# Patient Record
Sex: MALE | Race: WHITE | NOT HISPANIC OR LATINO | Employment: UNEMPLOYED | URBAN - METROPOLITAN AREA
[De-identification: names, ages, dates, MRNs, and addresses within clinical notes are randomized per-mention and may not be internally consistent; named-entity substitution may affect disease eponyms.]

---

## 2017-01-05 ENCOUNTER — ALLSCRIPTS OFFICE VISIT (OUTPATIENT)
Dept: OTHER | Facility: OTHER | Age: 32
End: 2017-01-05

## 2017-01-05 DIAGNOSIS — Z72.0 TOBACCO USE: ICD-10-CM

## 2017-02-02 ENCOUNTER — ALLSCRIPTS OFFICE VISIT (OUTPATIENT)
Dept: OTHER | Facility: OTHER | Age: 32
End: 2017-02-02

## 2017-02-22 ENCOUNTER — GENERIC CONVERSION - ENCOUNTER (OUTPATIENT)
Dept: OTHER | Facility: OTHER | Age: 32
End: 2017-02-22

## 2017-03-03 ENCOUNTER — ALLSCRIPTS OFFICE VISIT (OUTPATIENT)
Dept: OTHER | Facility: OTHER | Age: 32
End: 2017-03-03

## 2017-03-06 ENCOUNTER — TRANSCRIBE ORDERS (OUTPATIENT)
Dept: ADMINISTRATIVE | Facility: HOSPITAL | Age: 32
End: 2017-03-06

## 2017-03-06 DIAGNOSIS — R06.02 SOB (SHORTNESS OF BREATH): Primary | ICD-10-CM

## 2017-03-21 ENCOUNTER — HOSPITAL ENCOUNTER (OUTPATIENT)
Dept: PULMONOLOGY | Facility: HOSPITAL | Age: 32
Discharge: HOME | End: 2017-03-21
Attending: INTERNAL MEDICINE
Payer: MEDICARE

## 2017-03-21 ENCOUNTER — GENERIC CONVERSION - ENCOUNTER (OUTPATIENT)
Dept: OTHER | Facility: OTHER | Age: 32
End: 2017-03-21

## 2017-03-21 ENCOUNTER — HOSPITAL ENCOUNTER (OUTPATIENT)
Dept: RADIOLOGY | Facility: HOSPITAL | Age: 32
Discharge: HOME | End: 2017-03-21
Attending: INTERNAL MEDICINE
Payer: MEDICARE

## 2017-03-21 DIAGNOSIS — R06.02 SOB (SHORTNESS OF BREATH): ICD-10-CM

## 2017-03-21 DIAGNOSIS — Z72.0 TOBACCO USE: ICD-10-CM

## 2017-03-21 PROCEDURE — 71020 HB CHEST X-RAY 2VW FRONTAL&LATL: CPT

## 2017-03-21 PROCEDURE — 94760 N-INVAS EAR/PLS OXIMETRY 1: CPT

## 2017-03-21 PROCEDURE — 94727 GAS DIL/WSHOT DETER LNG VOL: CPT

## 2017-03-21 PROCEDURE — 94729 DIFFUSING CAPACITY: CPT

## 2017-03-21 PROCEDURE — 94060 EVALUATION OF WHEEZING: CPT

## 2017-03-31 ENCOUNTER — ALLSCRIPTS OFFICE VISIT (OUTPATIENT)
Dept: OTHER | Facility: OTHER | Age: 32
End: 2017-03-31

## 2017-07-06 ENCOUNTER — ALLSCRIPTS OFFICE VISIT (OUTPATIENT)
Dept: OTHER | Facility: OTHER | Age: 32
End: 2017-07-06

## 2017-11-09 ENCOUNTER — GENERIC CONVERSION - ENCOUNTER (OUTPATIENT)
Dept: OTHER | Facility: OTHER | Age: 32
End: 2017-11-09

## 2018-01-11 NOTE — MISCELLANEOUS
Message  Spoke with patient via telephone  He is complaining of migraine headaches since starting the modafanil for excessive daytime sleepiness, he is asking for an alternative medication  Will start patient on Nuvigil 150 1  mg once daily  Discussed there is still a chance he might get headaches while on this medication too  He will likely need prior authorization for this medication  He will need to f/u in 2 weeks to reevaluate  Tyler Palacios PA-C       1 Amended By: Kate Palma;  Feb 22 2017 1:00 PM EST    Signatures   Electronically signed by : Ab Simmons, [facility]; Feb 22 2017  1:00PM EST                       (Author)

## 2018-01-13 VITALS
BODY MASS INDEX: 21.05 KG/M2 | WEIGHT: 150.38 LBS | SYSTOLIC BLOOD PRESSURE: 110 MMHG | DIASTOLIC BLOOD PRESSURE: 80 MMHG | HEIGHT: 71 IN | HEART RATE: 67 BPM | OXYGEN SATURATION: 96 %

## 2018-01-13 VITALS
WEIGHT: 151.13 LBS | BODY MASS INDEX: 21.16 KG/M2 | OXYGEN SATURATION: 98 % | HEART RATE: 58 BPM | SYSTOLIC BLOOD PRESSURE: 116 MMHG | HEIGHT: 71 IN | DIASTOLIC BLOOD PRESSURE: 80 MMHG

## 2018-01-14 VITALS
BODY MASS INDEX: 20.77 KG/M2 | SYSTOLIC BLOOD PRESSURE: 120 MMHG | OXYGEN SATURATION: 97 % | HEART RATE: 74 BPM | DIASTOLIC BLOOD PRESSURE: 84 MMHG | HEIGHT: 71 IN | WEIGHT: 148.38 LBS

## 2018-01-14 VITALS
BODY MASS INDEX: 20.44 KG/M2 | DIASTOLIC BLOOD PRESSURE: 68 MMHG | SYSTOLIC BLOOD PRESSURE: 110 MMHG | HEART RATE: 66 BPM | WEIGHT: 146 LBS | HEIGHT: 71 IN | OXYGEN SATURATION: 98 %

## 2018-01-14 VITALS
OXYGEN SATURATION: 98 % | WEIGHT: 151.13 LBS | DIASTOLIC BLOOD PRESSURE: 70 MMHG | HEART RATE: 56 BPM | BODY MASS INDEX: 21.16 KG/M2 | HEIGHT: 71 IN | SYSTOLIC BLOOD PRESSURE: 110 MMHG

## 2018-01-22 VITALS
OXYGEN SATURATION: 97 % | SYSTOLIC BLOOD PRESSURE: 116 MMHG | WEIGHT: 142 LBS | BODY MASS INDEX: 19.88 KG/M2 | HEART RATE: 71 BPM | HEIGHT: 71 IN | DIASTOLIC BLOOD PRESSURE: 70 MMHG

## 2018-02-05 ENCOUNTER — TELEPHONE (OUTPATIENT)
Dept: PULMONOLOGY | Facility: CLINIC | Age: 33
End: 2018-02-05

## 2018-02-05 DIAGNOSIS — G47.11 IDIOPATHIC HYPERSOMNIA: Primary | ICD-10-CM

## 2018-02-05 DIAGNOSIS — G47.19 EXCESSIVE DAYTIME SLEEPINESS: Primary | ICD-10-CM

## 2018-02-05 NOTE — TELEPHONE ENCOUNTER
PT called for refill on Methylphenidate HCI ER 10MG oral tablet- Extended Release, needs written or printed script

## 2018-02-06 RX ORDER — METHYLPHENIDATE HYDROCHLORIDE 10 MG/1
20 TABLET ORAL
Qty: 60 TABLET | Refills: 0 | Status: SHIPPED | OUTPATIENT
Start: 2018-02-06 | End: 2018-04-11

## 2018-02-09 RX ORDER — METHYLPHENIDATE HYDROCHLORIDE 10 MG/1
10 CAPSULE, EXTENDED RELEASE ORAL DAILY
Qty: 30 CAPSULE | Refills: 0 | Status: SHIPPED | OUTPATIENT
Start: 2018-02-09 | End: 2018-04-11

## 2018-04-09 DIAGNOSIS — G47.19 EXCESSIVE DAYTIME SLEEPINESS: ICD-10-CM

## 2018-04-09 RX ORDER — METHYLPHENIDATE HYDROCHLORIDE 10 MG/1
20 TABLET ORAL
Qty: 60 TABLET | Refills: 0 | Status: CANCELLED | OUTPATIENT
Start: 2018-04-09
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
## 2018-04-10 ENCOUNTER — TELEPHONE (OUTPATIENT)
Dept: PULMONOLOGY | Facility: CLINIC | Age: 33
End: 2018-04-10

## 2018-04-10 NOTE — TELEPHONE ENCOUNTER
SPOKE TO PHARMACY [facility] ON [facility]  THE SCRIPTS THEY FILLED FROM US ON 3/6/18 WAS:  20 MG RITALIN TAKE 1 AT 5AM AND 1 AT 10AM  10 MG RITALIN ER TAKE 1 AT 3 PM AS NEEDED BOTH WERE A 30 DAY SUPPLY WITH NO REFILLS

## 2018-04-11 ENCOUNTER — TELEPHONE (OUTPATIENT)
Dept: PULMONOLOGY | Facility: CLINIC | Age: 33
End: 2018-04-11

## 2018-04-11 DIAGNOSIS — G47.11 IDIOPATHIC HYPERSOMNIA: ICD-10-CM

## 2018-04-11 DIAGNOSIS — G47.19 EXCESSIVE DAYTIME SLEEPINESS: ICD-10-CM

## 2018-04-11 RX ORDER — METHYLPHENIDATE HYDROCHLORIDE 20 MG/1
20 TABLET ORAL
COMMUNITY
End: 2018-04-12 | Stop reason: SDUPTHER

## 2018-04-11 RX ORDER — METHYLPHENIDATE HYDROCHLORIDE 10 MG/1
10 TABLET ORAL
COMMUNITY
End: 2018-04-12 | Stop reason: SDUPTHER

## 2018-04-11 NOTE — TELEPHONE ENCOUNTER
Pt called regarding his prescription methyophendate, he said he would like to pick it up    Please advise  Thank you

## 2018-04-11 NOTE — TELEPHONE ENCOUNTER
I will refill it for 1 more month  He will need a follow up appointment prior to additional refills  Dr Murali Madera would also like him to repeat the sleep study/MSLT with [facility]  Can have it at the [facility]  I will place the order  Please let the patient know

## 2018-04-12 DIAGNOSIS — G47.19 EXCESSIVE DAYTIME SLEEPINESS: Primary | ICD-10-CM

## 2018-04-12 RX ORDER — METHYLPHENIDATE HYDROCHLORIDE 10 MG/1
TABLET ORAL
Qty: 30 TABLET | Refills: 0 | Status: SHIPPED | OUTPATIENT
Start: 2018-04-12 | End: 2018-06-29 | Stop reason: HOSPADM

## 2018-04-12 RX ORDER — METHYLPHENIDATE HYDROCHLORIDE 20 MG/1
TABLET ORAL
Qty: 60 TABLET | Refills: 0 | Status: SHIPPED | OUTPATIENT
Start: 2018-04-12 | End: 2018-06-29 | Stop reason: HOSPADM

## 2018-04-26 ENCOUNTER — APPOINTMENT (INPATIENT)
Dept: RADIOLOGY | Facility: HOSPITAL | Age: 33
DRG: 501 | End: 2018-04-26
Payer: MEDICARE

## 2018-04-26 ENCOUNTER — ANESTHESIA EVENT (INPATIENT)
Dept: PERIOP | Facility: HOSPITAL | Age: 33
DRG: 501 | End: 2018-04-26
Payer: MEDICARE

## 2018-04-26 ENCOUNTER — APPOINTMENT (EMERGENCY)
Dept: CT IMAGING | Facility: HOSPITAL | Age: 33
DRG: 501 | End: 2018-04-26
Payer: MEDICARE

## 2018-04-26 ENCOUNTER — ANESTHESIA EVENT (OUTPATIENT)
Dept: EMERGENCY DEPT | Facility: HOSPITAL | Age: 33
End: 2018-04-26

## 2018-04-26 ENCOUNTER — HOSPITAL ENCOUNTER (INPATIENT)
Facility: HOSPITAL | Age: 33
LOS: 2 days | Discharge: HOME | DRG: 501 | End: 2018-04-28
Attending: EMERGENCY MEDICINE | Admitting: SURGERY
Payer: MEDICARE

## 2018-04-26 ENCOUNTER — APPOINTMENT (INPATIENT)
Dept: CT IMAGING | Facility: HOSPITAL | Age: 33
DRG: 501 | End: 2018-04-26
Payer: MEDICARE

## 2018-04-26 ENCOUNTER — ANESTHESIA (INPATIENT)
Dept: PERIOP | Facility: HOSPITAL | Age: 33
DRG: 501 | End: 2018-04-26
Payer: MEDICARE

## 2018-04-26 ENCOUNTER — ANESTHESIA (OUTPATIENT)
Dept: EMERGENCY DEPT | Facility: HOSPITAL | Age: 33
End: 2018-04-26

## 2018-04-26 DIAGNOSIS — F19.10 IV DRUG ABUSE (HCC): ICD-10-CM

## 2018-04-26 DIAGNOSIS — E46 MALNUTRITION (HCC): ICD-10-CM

## 2018-04-26 DIAGNOSIS — F19.10: ICD-10-CM

## 2018-04-26 DIAGNOSIS — L02.413 ABSCESS OF RIGHT ARM: Primary | ICD-10-CM

## 2018-04-26 DIAGNOSIS — L03.113 CELLULITIS OF RIGHT ARM: ICD-10-CM

## 2018-04-26 LAB
ABO GROUP BLD: NORMAL
ALBUMIN SERPL BCP-MCNC: 3.3 G/DL (ref 3.5–5)
ALP SERPL-CCNC: 105 U/L (ref 46–116)
ALT SERPL W P-5'-P-CCNC: 30 U/L (ref 12–78)
ANION GAP BLD CALC-SCNC: 15 MMOL/L (ref 4–13)
ANION GAP SERPL CALCULATED.3IONS-SCNC: 7 MMOL/L (ref 4–13)
APTT PPP: 32 SECONDS (ref 23–35)
AST SERPL W P-5'-P-CCNC: 22 U/L (ref 5–45)
BASOPHILS # BLD AUTO: 0.08 THOUSANDS/ΜL (ref 0–0.1)
BASOPHILS NFR BLD AUTO: 1 % (ref 0–1)
BILIRUB SERPL-MCNC: 0.5 MG/DL (ref 0.2–1)
BLD GP AB SCN SERPL QL: NEGATIVE
BUN BLD-MCNC: 11 MG/DL (ref 5–25)
BUN SERPL-MCNC: 12 MG/DL (ref 5–25)
CA-I BLD-SCNC: 1.13 MMOL/L (ref 1.12–1.32)
CALCIUM SERPL-MCNC: 8.6 MG/DL (ref 8.3–10.1)
CHLORIDE BLD-SCNC: 99 MMOL/L (ref 100–108)
CHLORIDE SERPL-SCNC: 100 MMOL/L (ref 100–108)
CO2 SERPL-SCNC: 29 MMOL/L (ref 21–32)
CREAT BLD-MCNC: 0.7 MG/DL (ref 0.6–1.3)
CREAT SERPL-MCNC: 0.78 MG/DL (ref 0.6–1.3)
CRP SERPL QL: 60.8 MG/L
EOSINOPHIL # BLD AUTO: 0.14 THOUSAND/ΜL (ref 0–0.61)
EOSINOPHIL NFR BLD AUTO: 1 % (ref 0–6)
ERYTHROCYTE [DISTWIDTH] IN BLOOD BY AUTOMATED COUNT: 12.4 % (ref 11.6–15.1)
ERYTHROCYTE [SEDIMENTATION RATE] IN BLOOD: 36 MM/HOUR (ref 0–10)
GFR SERPL CREATININE-BSD FRML MDRD: 119 ML/MIN/1.73SQ M
GFR SERPL CREATININE-BSD FRML MDRD: 124 ML/MIN/1.73SQ M
GLUCOSE SERPL-MCNC: 103 MG/DL (ref 65–140)
GLUCOSE SERPL-MCNC: 103 MG/DL (ref 65–140)
HCT VFR BLD AUTO: 35.8 % (ref 36.5–49.3)
HCT VFR BLD CALC: 35 % (ref 36.5–49.3)
HGB BLD-MCNC: 11.5 G/DL (ref 12–17)
HGB BLDA-MCNC: 11.9 G/DL (ref 12–17)
INR PPP: 0.96 (ref 0.86–1.16)
LYMPHOCYTES # BLD AUTO: 1.67 THOUSANDS/ΜL (ref 0.6–4.47)
LYMPHOCYTES NFR BLD AUTO: 13 % (ref 14–44)
MCH RBC QN AUTO: 29.3 PG (ref 26.8–34.3)
MCHC RBC AUTO-ENTMCNC: 32.1 G/DL (ref 31.4–37.4)
MCV RBC AUTO: 91 FL (ref 82–98)
MONOCYTES # BLD AUTO: 1.05 THOUSAND/ΜL (ref 0.17–1.22)
MONOCYTES NFR BLD AUTO: 8 % (ref 4–12)
NEUTROPHILS # BLD AUTO: 10.26 THOUSANDS/ΜL (ref 1.85–7.62)
NEUTS SEG NFR BLD AUTO: 77 % (ref 43–75)
NRBC BLD AUTO-RTO: 0 /100 WBCS
PCO2 BLD: 29 MMOL/L (ref 21–32)
PLATELET # BLD AUTO: 265 THOUSANDS/UL (ref 149–390)
PMV BLD AUTO: 9.1 FL (ref 8.9–12.7)
POTASSIUM BLD-SCNC: 3.8 MMOL/L (ref 3.5–5.3)
POTASSIUM SERPL-SCNC: 3.8 MMOL/L (ref 3.5–5.3)
PROT SERPL-MCNC: 7.5 G/DL (ref 6.4–8.2)
PROTHROMBIN TIME: 13 SECONDS (ref 12.1–14.4)
RBC # BLD AUTO: 3.93 MILLION/UL (ref 3.88–5.62)
RH BLD: POSITIVE
SODIUM BLD-SCNC: 138 MMOL/L (ref 136–145)
SODIUM SERPL-SCNC: 136 MMOL/L (ref 136–145)
SPECIMEN EXPIRATION DATE: NORMAL
SPECIMEN SOURCE: ABNORMAL
WBC # BLD AUTO: 13.26 THOUSAND/UL (ref 4.31–10.16)

## 2018-04-26 PROCEDURE — 85652 RBC SED RATE AUTOMATED: CPT | Performed by: EMERGENCY MEDICINE

## 2018-04-26 PROCEDURE — 86140 C-REACTIVE PROTEIN: CPT | Performed by: EMERGENCY MEDICINE

## 2018-04-26 PROCEDURE — 86901 BLOOD TYPING SEROLOGIC RH(D): CPT | Performed by: EMERGENCY MEDICINE

## 2018-04-26 PROCEDURE — 71250 CT THORAX DX C-: CPT

## 2018-04-26 PROCEDURE — 86900 BLOOD TYPING SEROLOGIC ABO: CPT | Performed by: EMERGENCY MEDICINE

## 2018-04-26 PROCEDURE — 87075 CULTR BACTERIA EXCEPT BLOOD: CPT | Performed by: SURGERY

## 2018-04-26 PROCEDURE — 93005 ELECTROCARDIOGRAM TRACING: CPT | Performed by: PHYSICIAN ASSISTANT

## 2018-04-26 PROCEDURE — 80047 BASIC METABLC PNL IONIZED CA: CPT

## 2018-04-26 PROCEDURE — 87070 CULTURE OTHR SPECIMN AEROBIC: CPT | Performed by: SURGERY

## 2018-04-26 PROCEDURE — 99222 1ST HOSP IP/OBS MODERATE 55: CPT | Performed by: SURGERY

## 2018-04-26 PROCEDURE — 85025 COMPLETE CBC W/AUTO DIFF WBC: CPT | Performed by: EMERGENCY MEDICINE

## 2018-04-26 PROCEDURE — 80053 COMPREHEN METABOLIC PANEL: CPT | Performed by: EMERGENCY MEDICINE

## 2018-04-26 PROCEDURE — 85610 PROTHROMBIN TIME: CPT | Performed by: EMERGENCY MEDICINE

## 2018-04-26 PROCEDURE — 87176 TISSUE HOMOGENIZATION CULTR: CPT | Performed by: SURGERY

## 2018-04-26 PROCEDURE — 85014 HEMATOCRIT: CPT

## 2018-04-26 PROCEDURE — 87147 CULTURE TYPE IMMUNOLOGIC: CPT | Performed by: SURGERY

## 2018-04-26 PROCEDURE — 87185 SC STD ENZYME DETCJ PER NZM: CPT | Performed by: SURGERY

## 2018-04-26 PROCEDURE — 87040 BLOOD CULTURE FOR BACTERIA: CPT | Performed by: EMERGENCY MEDICINE

## 2018-04-26 PROCEDURE — 96365 THER/PROPH/DIAG IV INF INIT: CPT

## 2018-04-26 PROCEDURE — 87205 SMEAR GRAM STAIN: CPT | Performed by: SURGERY

## 2018-04-26 PROCEDURE — 10061 I&D ABSCESS COMP/MULTIPLE: CPT | Performed by: SURGERY

## 2018-04-26 PROCEDURE — 36415 COLL VENOUS BLD VENIPUNCTURE: CPT | Performed by: EMERGENCY MEDICINE

## 2018-04-26 PROCEDURE — 99285 EMERGENCY DEPT VISIT HI MDM: CPT

## 2018-04-26 PROCEDURE — 85730 THROMBOPLASTIN TIME PARTIAL: CPT | Performed by: EMERGENCY MEDICINE

## 2018-04-26 PROCEDURE — 73201 CT UPPER EXTREMITY W/DYE: CPT

## 2018-04-26 PROCEDURE — 0K990ZZ DRAINAGE OF RIGHT LOWER ARM AND WRIST MUSCLE, OPEN APPROACH: ICD-10-PCS | Performed by: SURGERY

## 2018-04-26 PROCEDURE — 96366 THER/PROPH/DIAG IV INF ADDON: CPT

## 2018-04-26 PROCEDURE — 86850 RBC ANTIBODY SCREEN: CPT | Performed by: EMERGENCY MEDICINE

## 2018-04-26 PROCEDURE — 71045 X-RAY EXAM CHEST 1 VIEW: CPT

## 2018-04-26 PROCEDURE — 87077 CULTURE AEROBIC IDENTIFY: CPT | Performed by: SURGERY

## 2018-04-26 PROCEDURE — 99233 SBSQ HOSP IP/OBS HIGH 50: CPT | Performed by: INTERNAL MEDICINE

## 2018-04-26 RX ORDER — ONDANSETRON 2 MG/ML
4 INJECTION INTRAMUSCULAR; INTRAVENOUS EVERY 6 HOURS PRN
Status: DISCONTINUED | OUTPATIENT
Start: 2018-04-26 | End: 2018-04-28 | Stop reason: HOSPADM

## 2018-04-26 RX ORDER — ONDANSETRON 2 MG/ML
4 INJECTION INTRAMUSCULAR; INTRAVENOUS ONCE AS NEEDED
Status: DISCONTINUED | OUTPATIENT
Start: 2018-04-26 | End: 2018-04-26 | Stop reason: HOSPADM

## 2018-04-26 RX ORDER — METHADONE HYDROCHLORIDE 10 MG/1
10 TABLET ORAL ONCE
Status: DISCONTINUED | OUTPATIENT
Start: 2018-04-26 | End: 2018-04-26

## 2018-04-26 RX ORDER — PROPOFOL 10 MG/ML
INJECTION, EMULSION INTRAVENOUS AS NEEDED
Status: DISCONTINUED | OUTPATIENT
Start: 2018-04-26 | End: 2018-04-26 | Stop reason: SURG

## 2018-04-26 RX ORDER — METHADONE HYDROCHLORIDE 10 MG/ML
137 CONCENTRATE ORAL DAILY
Status: DISCONTINUED | OUTPATIENT
Start: 2018-04-27 | End: 2018-04-26

## 2018-04-26 RX ORDER — MAGNESIUM HYDROXIDE 1200 MG/15ML
LIQUID ORAL AS NEEDED
Status: DISCONTINUED | OUTPATIENT
Start: 2018-04-26 | End: 2018-04-26 | Stop reason: HOSPADM

## 2018-04-26 RX ORDER — METHADONE HYDROCHLORIDE 40 MG/1
137 TABLET ORAL
COMMUNITY
End: 2018-07-11 | Stop reason: HOSPADM

## 2018-04-26 RX ORDER — PROPOFOL 10 MG/ML
INJECTION, EMULSION INTRAVENOUS CONTINUOUS PRN
Status: DISCONTINUED | OUTPATIENT
Start: 2018-04-26 | End: 2018-04-26 | Stop reason: SURG

## 2018-04-26 RX ORDER — SODIUM CHLORIDE, SODIUM LACTATE, POTASSIUM CHLORIDE, CALCIUM CHLORIDE 600; 310; 30; 20 MG/100ML; MG/100ML; MG/100ML; MG/100ML
INJECTION, SOLUTION INTRAVENOUS CONTINUOUS PRN
Status: DISCONTINUED | OUTPATIENT
Start: 2018-04-26 | End: 2018-04-26 | Stop reason: SURG

## 2018-04-26 RX ORDER — KETOROLAC TROMETHAMINE 30 MG/ML
15 INJECTION, SOLUTION INTRAMUSCULAR; INTRAVENOUS EVERY 6 HOURS SCHEDULED
Status: DISCONTINUED | OUTPATIENT
Start: 2018-04-26 | End: 2018-04-28 | Stop reason: HOSPADM

## 2018-04-26 RX ORDER — VANCOMYCIN HYDROCHLORIDE 1 G/200ML
15 INJECTION, SOLUTION INTRAVENOUS ONCE
Status: COMPLETED | OUTPATIENT
Start: 2018-04-26 | End: 2018-04-26

## 2018-04-26 RX ORDER — METHADONE HYDROCHLORIDE 10 MG/ML
5 INJECTION, SOLUTION INTRAMUSCULAR; INTRAVENOUS; SUBCUTANEOUS ONCE
Status: DISCONTINUED | OUTPATIENT
Start: 2018-04-26 | End: 2018-04-26

## 2018-04-26 RX ORDER — ACETAMINOPHEN 325 MG/1
650 TABLET ORAL EVERY 6 HOURS PRN
Status: DISCONTINUED | OUTPATIENT
Start: 2018-04-26 | End: 2018-04-28 | Stop reason: HOSPADM

## 2018-04-26 RX ORDER — DEXTROSE, SODIUM CHLORIDE, AND POTASSIUM CHLORIDE 5; .45; .15 G/100ML; G/100ML; G/100ML
125 INJECTION INTRAVENOUS CONTINUOUS
Status: DISCONTINUED | OUTPATIENT
Start: 2018-04-26 | End: 2018-04-28 | Stop reason: HOSPADM

## 2018-04-26 RX ORDER — KETAMINE HYDROCHLORIDE 50 MG/ML
INJECTION, SOLUTION, CONCENTRATE INTRAMUSCULAR; INTRAVENOUS AS NEEDED
Status: DISCONTINUED | OUTPATIENT
Start: 2018-04-26 | End: 2018-04-26 | Stop reason: SURG

## 2018-04-26 RX ORDER — SODIUM CHLORIDE, SODIUM LACTATE, POTASSIUM CHLORIDE, CALCIUM CHLORIDE 600; 310; 30; 20 MG/100ML; MG/100ML; MG/100ML; MG/100ML
75 INJECTION, SOLUTION INTRAVENOUS CONTINUOUS
Status: DISCONTINUED | OUTPATIENT
Start: 2018-04-26 | End: 2018-04-28 | Stop reason: HOSPADM

## 2018-04-26 RX ORDER — METOCLOPRAMIDE HYDROCHLORIDE 5 MG/ML
10 INJECTION INTRAMUSCULAR; INTRAVENOUS ONCE AS NEEDED
Status: DISCONTINUED | OUTPATIENT
Start: 2018-04-26 | End: 2018-04-26 | Stop reason: HOSPADM

## 2018-04-26 RX ORDER — DOCUSATE SODIUM 100 MG/1
100 CAPSULE, LIQUID FILLED ORAL 2 TIMES DAILY
Status: DISCONTINUED | OUTPATIENT
Start: 2018-04-26 | End: 2018-04-28 | Stop reason: HOSPADM

## 2018-04-26 RX ORDER — KETOROLAC TROMETHAMINE 30 MG/ML
30 INJECTION, SOLUTION INTRAMUSCULAR; INTRAVENOUS ONCE
Status: COMPLETED | OUTPATIENT
Start: 2018-04-26 | End: 2018-04-26

## 2018-04-26 RX ORDER — NICOTINE 21 MG/24HR
1 PATCH, TRANSDERMAL 24 HOURS TRANSDERMAL DAILY
Status: DISCONTINUED | OUTPATIENT
Start: 2018-04-26 | End: 2018-04-28 | Stop reason: HOSPADM

## 2018-04-26 RX ORDER — METHADONE HYDROCHLORIDE 10 MG/ML
137 CONCENTRATE ORAL DAILY
Status: DISCONTINUED | OUTPATIENT
Start: 2018-04-26 | End: 2018-04-27

## 2018-04-26 RX ORDER — VANCOMYCIN HYDROCHLORIDE 1 G/200ML
15 INJECTION, SOLUTION INTRAVENOUS EVERY 12 HOURS
Status: DISCONTINUED | OUTPATIENT
Start: 2018-04-27 | End: 2018-04-28 | Stop reason: HOSPADM

## 2018-04-26 RX ORDER — BUPIVACAINE HYDROCHLORIDE 5 MG/ML
INJECTION, SOLUTION PERINEURAL AS NEEDED
Status: DISCONTINUED | OUTPATIENT
Start: 2018-04-26 | End: 2018-04-26 | Stop reason: SURG

## 2018-04-26 RX ORDER — MIDAZOLAM HYDROCHLORIDE 1 MG/ML
INJECTION INTRAMUSCULAR; INTRAVENOUS AS NEEDED
Status: DISCONTINUED | OUTPATIENT
Start: 2018-04-26 | End: 2018-04-26 | Stop reason: SURG

## 2018-04-26 RX ADMIN — PROPOFOL 100 MCG/KG/MIN: 10 INJECTION, EMULSION INTRAVENOUS at 20:30

## 2018-04-26 RX ADMIN — DEXTROSE, SODIUM CHLORIDE, AND POTASSIUM CHLORIDE 125 ML/HR: 5; .45; .15 INJECTION INTRAVENOUS at 21:57

## 2018-04-26 RX ADMIN — KETOROLAC TROMETHAMINE 15 MG: 30 INJECTION, SOLUTION INTRAMUSCULAR at 23:35

## 2018-04-26 RX ADMIN — VANCOMYCIN HYDROCHLORIDE 1000 MG: 1 INJECTION, SOLUTION INTRAVENOUS at 08:27

## 2018-04-26 RX ADMIN — IOHEXOL 100 ML: 350 INJECTION, SOLUTION INTRAVENOUS at 08:19

## 2018-04-26 RX ADMIN — KETAMINE HYDROCHLORIDE 25 MG: 50 INJECTION INTRAMUSCULAR; INTRAVENOUS at 18:33

## 2018-04-26 RX ADMIN — NICOTINE 1 PATCH: 21 PATCH TRANSDERMAL at 13:01

## 2018-04-26 RX ADMIN — BUPIVACAINE HYDROCHLORIDE 30 ML: 5 INJECTION, SOLUTION PERINEURAL at 18:33

## 2018-04-26 RX ADMIN — SODIUM CHLORIDE 1000 ML: 0.9 INJECTION, SOLUTION INTRAVENOUS at 07:58

## 2018-04-26 RX ADMIN — MIDAZOLAM 2 MG: 1 INJECTION INTRAMUSCULAR; INTRAVENOUS at 18:33

## 2018-04-26 RX ADMIN — KETOROLAC TROMETHAMINE 30 MG: 30 INJECTION, SOLUTION INTRAMUSCULAR at 13:02

## 2018-04-26 RX ADMIN — KETAMINE HYDROCHLORIDE 25 MG: 50 INJECTION INTRAMUSCULAR; INTRAVENOUS at 18:31

## 2018-04-26 RX ADMIN — PROPOFOL 100 MG: 10 INJECTION, EMULSION INTRAVENOUS at 20:30

## 2018-04-26 RX ADMIN — VANCOMYCIN HYDROCHLORIDE 1 G: 1 INJECTION, POWDER, LYOPHILIZED, FOR SOLUTION INTRAVENOUS at 20:24

## 2018-04-26 RX ADMIN — SODIUM CHLORIDE, SODIUM LACTATE, POTASSIUM CHLORIDE, AND CALCIUM CHLORIDE: .6; .31; .03; .02 INJECTION, SOLUTION INTRAVENOUS at 18:25

## 2018-04-26 RX ADMIN — METHADONE HYDROCHLORIDE 137 MG: 10 CONCENTRATE ORAL at 17:20

## 2018-04-26 NOTE — ED PROVIDER NOTES
History  Chief Complaint   Patient presents with    Abscess     pt left [facility] ED  was supposed to have surgery to drain abscess on right arm  would like 2nd opinion     39-year-old male with a history of IV drug abuse on methadone presenting with chief complaint of right elbow abscess  The patient states approximately 6 days ago he started using heroin again he had some small amount of erythema redness and warmth localized to his right antecubital area since that time it has gotten progressively worse he went to another hospital yesterday was admitted for IV antibiotics planned to go to the operating this room this morning, he left against medical advice apparently because he was dissatisfied with the care and is here for 2nd opinion  Patient localizes pain primarily to his right proximal forearm and elbow is nonradiating it is worse when he tries to move it is better with rest he is not taking for this he denies weakness paresthesias or anesthesia he denies fevers or chills other infectious complaints he otherwise denies complete review systems as noted            Prior to Admission Medications   Prescriptions Last Dose Informant Patient Reported? Taking? methadone (METHADOSE) 40 MG disintegrating tablet 4/25/2018 at Unknown time  Yes Yes   Sig: Take 137 mg by mouth daily in the early morning     methylphenidate (RITALIN) 10 mg tablet 4/23/2018  No No   Sig: One tab at 3PM if needed   methylphenidate (RITALIN) 20 MG tablet 4/23/2018  No No   Sig: One tab 6am and one at10am      Facility-Administered Medications: None       Past Medical History:   Diagnosis Date    Excessive daytime sleepiness     Idiopathic hypersomnia     Tobacco use        Past Surgical History:   Procedure Laterality Date    SHOULDER SURGERY Right     titanium plate       History reviewed  No pertinent family history  I have reviewed and agree with the history as documented      Social History   Substance Use Topics  Smoking status: Current Every Day Smoker     Packs/day: 2 00     Types: Cigarettes    Smokeless tobacco: Never Used    Alcohol use No        Review of Systems   Constitutional: Negative for chills and fever  HENT: Negative for rhinorrhea and sore throat  Respiratory: Negative for cough and shortness of breath  Cardiovascular: Negative for chest pain and palpitations  Gastrointestinal: Negative for abdominal pain, diarrhea, nausea and vomiting  Genitourinary: Negative for dysuria, frequency and urgency  Musculoskeletal: Positive for joint swelling  Negative for arthralgias, back pain and myalgias  Skin: Positive for color change and wound  Negative for rash  Neurological: Negative for dizziness, weakness and light-headedness  Hematological: Negative for adenopathy  Does not bruise/bleed easily  Psychiatric/Behavioral: Negative for agitation and behavioral problems  All other systems reviewed and are negative  Physical Exam  ED Triage Vitals [04/26/18 0722]   Temperature Pulse Respirations Blood Pressure SpO2   98 8 °F (37 1 °C) 73 17 137/80 98 %      Temp Source Heart Rate Source Patient Position - Orthostatic VS BP Location FiO2 (%)   Oral Monitor Sitting Left arm --      Pain Score       Worst Possible Pain           Orthostatic Vital Signs  Vitals:    04/26/18 2145 04/26/18 2200 04/26/18 2215 04/26/18 2230   BP: 110/76 104/68 114/68 112/78   Pulse: 62 64 60 58   Patient Position - Orthostatic VS: Sitting Lying Lying Lying       Physical Exam   Constitutional: He is oriented to person, place, and time  He appears well-developed and well-nourished  No distress  Well-appearing in no acute distress   HENT:   Head: Normocephalic and atraumatic  Eyes: EOM are normal  Pupils are equal, round, and reactive to light  Neck: Normal range of motion  Neck supple  No tracheal deviation present  Cardiovascular: Normal rate, regular rhythm and normal heart sounds    Exam reveals no gallop and no friction rub  No murmur heard  Pulmonary/Chest: Effort normal and breath sounds normal  He has no wheezes  He has no rales  Abdominal: Soft  Bowel sounds are normal  He exhibits no distension  There is no tenderness  There is no rebound and no guarding  Musculoskeletal:   Patient is moderate erythema and swelling circumferentially of his right proximal forearm he has moderate swelling in this area including his right elbow into distal arm, he is holding his elbow in a flexed position he has approximately 5-10 degrees of movement otherwise elbow is frozen no lymphangitis distal hands neurovascularly intact no infectious findings on exam   Neurological: He is alert and oriented to person, place, and time  No cranial nerve deficit  He exhibits normal muscle tone  Coordination normal    Skin: Skin is warm and dry  No rash noted  Psychiatric: He has a normal mood and affect  His behavior is normal    Nursing note and vitals reviewed        ED Medications  Medications   dextrose 5 % and sodium chloride 0 45 % with KCl 20 mEq/L infusion (125 mL/hr Intravenous New Bag 4/26/18 2157)   docusate sodium (COLACE) capsule 100 mg ( Oral MAR Unhold 4/26/18 2153)   magnesium hydroxide (MILK OF MAGNESIA) 400 mg/5 mL oral suspension 30 mL ( Oral MAR Unhold 4/26/18 2153)   ondansetron (ZOFRAN) injection 4 mg ( Intravenous MAR Unhold 4/26/18 2153)   nicotine (NICODERM CQ) 21 mg/24 hr TD 24 hr patch 1 patch ( Transdermal MAR Unhold 4/26/18 2153)   enoxaparin (LOVENOX) subcutaneous injection 40 mg ( Subcutaneous MAR Unhold 4/26/18 2153)   acetaminophen (TYLENOL) tablet 650 mg ( Oral MAR Unhold 4/26/18 2153)   ketorolac (TORADOL) injection 15 mg ( Intravenous MAR Unhold 4/26/18 2153)   methadone (DOLOPHINE) 10 mg/mL oral concentrated solution 137 mg (137 mg Oral Given 4/26/18 1720)   vancomycin (VANCOCIN) IVPB (premix) 1,000 mg (not administered)   lactated ringers infusion (not administered)   sodium chloride 0 9 % bolus 1,000 mL (0 mL Intravenous Stopped 4/26/18 0900)   vancomycin (VANCOCIN) IVPB (premix) 1,000 mg (0 mg/kg × 63 6 kg Intravenous Stopped 4/26/18 1021)   iohexol (OMNIPAQUE) 350 MG/ML injection (MULTI-DOSE) 100 mL (100 mL Intravenous Given 4/26/18 0819)   ketorolac (TORADOL) injection 30 mg (30 mg Intravenous Given 4/26/18 1302)       Diagnostic Studies  Results Reviewed     Procedure Component Value Units Date/Time    Platelet count [[id number]]     Lab Status:  No result Specimen:  Blood     C-reactive protein [[id number]]  (Abnormal) Collected:  04/26/18 0756    Lab Status:  Final result Specimen:  Blood from Arm, Left Updated:  04/26/18 0951     CRP 60 8 (H) mg/L     Sedimentation rate, automated [[id number]]  (Abnormal) Collected:  04/26/18 0756    Lab Status:  Final result Specimen:  Blood from Arm, Left Updated:  04/26/18 0926     Sed Rate 36 (H) mm/hour     Comprehensive metabolic panel [[id number]]  (Abnormal) Collected:  04/26/18 0756    Lab Status:  Final result Specimen:  Blood from Arm, Left Updated:  04/26/18 0836     Sodium 136 mmol/L      Potassium 3 8 mmol/L      Chloride 100 mmol/L      CO2 29 mmol/L      Anion Gap 7 mmol/L      BUN 12 mg/dL      Creatinine 0 78 mg/dL      Glucose 103 mg/dL      Calcium 8 6 mg/dL      AST 22 U/L      ALT 30 U/L      Alkaline Phosphatase 105 U/L      Total Protein 7 5 g/dL      Albumin 3 3 (L) g/dL      Total Bilirubin 0 50 mg/dL      eGFR 119 ml/min/1 73sq m     Narrative:         National Kidney Disease Education Program recommendations are as follows:  GFR calculation is accurate only with a steady state creatinine  Chronic Kidney disease less than 60 ml/min/1 73 sq  meters  Kidney failure less than 15 ml/min/1 73 sq  meters      Candi Simms [[id number]]  (Normal) Collected:  04/26/18 0756    Lab Status:  Final result Specimen:  Blood from Arm, Left Updated:  04/26/18 0830     Protime 13 0 seconds      INR 0 96    APTT [[id number]]  (Normal) Collected:  04/26/18 0756    Lab Status:  Final result Specimen:  Blood from Arm, Left Updated:  04/26/18 0830     PTT 32 seconds     POCT Chem 8+ [[id number]]  (Abnormal) Collected:  04/26/18 0804    Lab Status:  Final result Updated:  04/26/18 0809     SODIUM, I-STAT 138 mmol/l      Potassium, i-STAT 3 8 mmol/L      Chloride, istat 99 (L) mmol/L      CO2, i-STAT 29 mmol/L      Anion Gap, Istat 15 (H) mmol/L      Calcium, Ionized i-STAT 1 13 mmol/L      BUN, I-STAT 11 mg/dl      Creatinine, i-STAT 0 7 mg/dl      eGFR 124 ml/min/1 73sq m      Glucose, i-STAT 103 mg/dl      Hct, i-STAT 35 (L) %      Hgb, i-STAT 11 9 (L) g/dl      Specimen Type VENOUS    CBC and differential [[id number]]  (Abnormal) Collected:  04/26/18 0756    Lab Status:  Final result Specimen:  Blood from Arm, Left Updated:  04/26/18 0805     WBC 13 26 (H) Thousand/uL      RBC 3 93 Million/uL      Hemoglobin 11 5 (L) g/dL      Hematocrit 35 8 (L) %      MCV 91 fL      MCH 29 3 pg      MCHC 32 1 g/dL      RDW 12 4 %      MPV 9 1 fL      Platelets 057 Thousands/uL      nRBC 0 /100 WBCs      Neutrophils Relative 77 (H) %      Lymphocytes Relative 13 (L) %      Monocytes Relative 8 %      Eosinophils Relative 1 %      Basophils Relative 1 %      Neutrophils Absolute 10 26 (H) Thousands/µL      Lymphocytes Absolute 1 67 Thousands/µL      Monocytes Absolute 1 05 Thousand/µL      Eosinophils Absolute 0 14 Thousand/µL      Basophils Absolute 0 08 Thousands/µL     Blood culture #1 [[id number]] Collected:  04/26/18 0756    Lab Status: In process Specimen:  Blood from Hand, Left Updated:  04/26/18 0801    Blood culture #2 [[id number]] Collected:  04/26/18 0756    Lab Status: In process Specimen:  Blood from Arm, Left Updated:  04/26/18 0800                 CT chest without contrast   Final Result by Ankush Jacobo DO (04/26 1605)      No right lung cyst or cavitary lesion to correspond to chest film finding, presumably artifactual   No consolidation        2 mm left upper lobe pulmonary nodule, of doubtful clinical significance  Based on current Fleischner Society 2017 Guidelines on incidental pulmonary nodule, in this patient who is less than 39years of age, management decisions regarding follow-up    should be made on a case by case basis, keeping in mind that infectious causes are more likely than cancer and that use of serial CT should be minimized  Workstation performed: [id number]         XR chest pa only   Final Result by Roger So MD (04/26 0949)      Question of a thin-walled cyst or possibly small cavitary lesion in the right midlung overlying the right posterior 6th rib  Recommend follow-up chest CT  I personally discussed this study with Alida Carpenterd on 4/26/2018 at 11:26 AM                   Workstation performed: [id number]         CT elbow right w contrast   Final Result by Angelo Pringle MD (04/26 9896)   Antecubital fossa intramuscular abscess as above  The study was marked in [facility] for immediate notification        Workstation performed: [id number]                    Procedures  Procedures       Phone Contacts  ED Phone Contact    ED Course  ED Course as of Apr 26 2239   Thu Apr 26, 2018   0805 Patient has circumferential erythema of his proximal forearm right elbow and right distal humerus, will not attempt aspiration as will be required to go through cellulitic area into joint space, CT scan pending, could surgical evaluation pending    0901 Surgery paged    0909 Surgery will be down                                MDM  Number of Diagnoses or Management Options  Abscess of right arm:   Cellulitis of right arm:   IV drug abuse:   Diagnosis management comments: 65-year-old male history of IV heroin abuse started using approximately 6 days ago subsequently developed erythema warmth and redness in his right antecubital area got progressively worse admitted yesterday planned OR this morning left against medical advice here for further treatment and management he absolutely does not want to go back to the other hospital wants to be evaluated here, his pain is localized to his right elbow only, it is moderately swelling, started antecubital area may have progressed joint unclear clincially, patient had reported ultrasound done no other imaging, will attempt to obtain records however records may not be complete at this time, will instead perform CT scan, discuss with surgery IV antibiotics likely admit    CritCare Time    Disposition  Final diagnoses:   Abscess of right arm   Cellulitis of right arm   IV drug abuse     Time reflects when diagnosis was documented in both MDM as applicable and the Disposition within this note     Time User Action Codes Description Comment    4/26/2018  9:06 AM Sharri Yan Add [L02 413] Abscess of right arm     4/26/2018 10:32 AM Delmi MCDONOUGH Add [L03 113] Cellulitis of right arm     4/26/2018 10:32 AM Delmi MCDONOUGH Add [F19 10] IV drug abuse     4/26/2018 10:38 AM Lazarus WALTERS Add [F19 90] Substance abuse requiring supervised withdrawal     4/26/2018  8:39 PM Buzz Lipps Modify [L02 413] Abscess of right arm       ED Disposition     ED Disposition Condition Comment    Admit  Case was discussed with Donovan Hameed and the patient's admission status was agreed to be Admission Status: inpatient status to the service of Dr Donovan Hameed   Follow-up Information    None       Current Discharge Medication List      CONTINUE these medications which have NOT CHANGED    Details   methadone (METHADOSE) 40 MG disintegrating tablet Take 137 mg by mouth daily in the early morning        !! methylphenidate (RITALIN) 10 mg tablet One tab at 3PM if needed  Qty: 30 tablet, Refills: 0    Associated Diagnoses: Excessive daytime sleepiness      !! methylphenidate (RITALIN) 20 MG tablet One tab 6am and one at10am  Qty: 60 tablet, Refills: 0    Associated Diagnoses: Excessive daytime sleepiness       !! - Potential duplicate medications found  Please discuss with provider  No discharge procedures on file      ED Provider  Electronically Signed by           Jenifer Mcdaniel DO  04/26/18 6774

## 2018-04-26 NOTE — H&P
GENERAL SURGERY HISTORY AND PHYSICAL      Severa Fess 34 y o  male MRN: [id number]  Unit/Bed#: ED 26 Encounter: [id number]      Assessment/Plan   RIGHT Abscess of  antecubital fossa flexor musculature indicating abscess measuring 3 4 x 5 1 x 7 1 cm  Thrombosed superficial vein in this region with inflammatory change consistent with thrombophlebitis    -admit for IV antibiotics  Cont Vanco  -I & D of abscess in OR  -NPO   -non narcotic pain control  - CXR, resulted in possible septic emboli of right lung, CT to be obtained  Chief Complaint pain and swelling in right arm x 6 days  Recent IV heroin relapse  HPI: Severa Fess is a 34y o  year old male who presents with pain and swelling in crease of right arm that started 6 days ago  Pt was admitted to [facility] yesterday and signed out [facility] last night where they were planning I & D washout  he was given IV antibiotic and much of the redness has resolved but not pain and swelling  Pt reports to our ED with same complaints of pain and swelling  He has a h o  IV drug abuse and on methadone 137 mg daily  You Dasilva He recently relapsed with IV heroin  Last use was 3 days ago He denies weakness or pain in right hand, fever or chills  He denies SOB, chest pain  He  can not straighten his arm out because of pain  No loss of motor in hand  PMH:  Smoker, IVDA heroin, on Methadone, with recent relapse  Hypersomnia, No surgical history  WBC 13   Ct Elbow Right W Contrast    Result Date: 4/26/2018  Impression: Antecubital fossa intramuscular abscess as above  The study was marked in [facility] for immediate notification   Workstation performed: [id number]       ROS:  12 Point ROS reviewed and negative except for:  Pain right arm  Swelling right arm  Smoker  Recent relapse with IV heroin, on methadone maintenance   Otherwise all negative    Historical Information   Past Medical History:   Diagnosis Date    Excessive daytime sleepiness     Idiopathic hypersomnia     Tobacco use      History reviewed  No pertinent surgical history  Social History   History   Alcohol Use No     History   Drug Use    Types: Heroin     History   Smoking Status    Current Every Day Smoker    Packs/day: 2 00    Types: Cigarettes   Smokeless Tobacco    Never Used     Family History: no pertinent family history  Allergies   Allergen Reactions    Quetiapine Other (See Comments)     parodoxical reaction     Meds/Allergies   current meds:   No current facility-administered medications for this encounter  Objective   Vitals: Blood pressure 121/74, pulse 70, temperature 98 8 °F (37 1 °C), temperature source Oral, resp  rate 18, weight 63 6 kg (140 lb 3 2 oz), SpO2 99 %  ,Body mass index is 19 83 kg/m²  No intake or output data in the 24 hours ending 04/26/18 0945  Invasive Devices     Peripheral Intravenous Line            Peripheral IV 04/26/18 Left Antecubital less than 1 day                Physical Exam:    General appearance: alert, appears stated age and cooperative, cachetic  HEENT: PERRLA, EOMI, sclera clear, anicterus, oral mucosa is    Back: no tenderness,deformity,   Lungs:clear throughout  Heart[de-identified] RRR, S1, S2 normal, no murmur  Abdomen: soft, nontender, NBS no masses, organomegaly, pulsations or bruits  Extremities:RIGHT ARM:  Soft tissue edema and enlargement of right arm, antecubital area  Flexed at the elbow, and pain with extention  Sensory and motor function of hand  No erythema  No drainage or head of abscess  This is diffuse from forearm to bicep  Tenderness throughout area       Neurologic: CN II-XII grossly intact, no tremor, affect appropriate    Lab Results:   CBC with diff:   Lab Results   Component Value Date    WBC 13 26 (H) 04/26/2018    HGB 11 9 (L) 04/26/2018    HCT 35 8 (L) 04/26/2018    MCV 91 04/26/2018     04/26/2018    MCH 29 3 04/26/2018    MCHC 32 1 04/26/2018    RDW 12 4 04/26/2018    MPV 9 1 04/26/2018    NRBC 0 04/26/2018   , BMP/CMP:   Lab Results   Component Value Date     04/26/2018    K 3 8 04/26/2018     04/26/2018    CO2 29 04/26/2018    ANIONGAP 7 04/26/2018    BUN 12 04/26/2018    CREATININE 0 78 04/26/2018    GLUCOSE 103 04/26/2018    CALCIUM 8 6 04/26/2018    AST 22 04/26/2018    ALT 30 04/26/2018    ALKPHOS 105 04/26/2018    PROT 7 5 04/26/2018    BILITOT 0 50 04/26/2018    EGFR 124 04/26/2018   , Blood Culture: No results found for: BLOODCX, Wound Culure: No results found for: WOUNDCULT  Imaging Studies: Ct Elbow Right W Contrast    Result Date: 4/26/2018  Impression: Antecubital fossa intramuscular abscess as above  The study was marked in [facility] for immediate notification   Workstation performed: [id number]       VTE Prophylaxis: Sequential compression device (Venodyne)  and Lovenox      Code Status: Level 1   Advance Directive and Living Will:      Power of :    POLST:      John Reyes PA-C  4/26/2018

## 2018-04-26 NOTE — ANESTHESIA PREPROCEDURE EVALUATION
Review of Systems/Medical History  Patient summary reviewed  Chart reviewed  No history of anesthetic complications     Cardiovascular  Negative cardio ROS Exercise tolerance: good,     Pulmonary  Smoker cigarette smoker  Cumulative Pack Years: 27,        GI/Hepatic  Negative GI/hepatic ROS          Negative  ROS        Endo/Other    Comment: Sepsis POA due to IVDA with right Antecubital fossa abscess  Last cocaine 5 days ago    GYN       Hematology  Negative hematology ROS      Musculoskeletal  Negative musculoskeletal ROS        Neurology  Negative neurology ROS      Psychology   Psychiatric history,   Chronic opioid dependence   Comment: Methadone maintenance through [facility]  Dosing confirmed 137 mg qday  Will get preop  Physical Exam    Airway    Mallampati score: II  TM Distance: >3 FB  Neck ROM: limited     Dental       Cardiovascular  Comment: Negative ROS, Rhythm: regular, Rate: normal,     Pulmonary  Pulmonary exam normal Breath sounds clear to auscultation,     Other Findings        Anesthesia Plan  ASA Score- 3 Emergent    Anesthesia Type- general and regional with ASA Monitors  Additional Monitors:   Airway Plan: LMA  Comment: No supplemental opioids- IV Precedex and IV ketamine  Need to confirm methadone dosing prior to giving maintenance dosing  Supraclav vs ax block for post op analgesia (has clavicle hardware which may make US imaging difficult)        Plan Factors-    Induction- intravenous  Postoperative Plan-   Planned trial extubation    Informed Consent- Anesthetic plan and risks discussed with patient  I personally reviewed this patient with the CRNA  Discussed and agreed on the Anesthesia Plan with the CRNA           Lab Results   Component Value Date    WBC 13 26 (H) 04/26/2018    HGB 11 9 (L) 04/26/2018    HCT 35 8 (L) 04/26/2018    MCV 91 04/26/2018     04/26/2018     Lab Results   Component Value Date    GLUCOSE 103 04/26/2018    CALCIUM 8 6 04/26/2018     04/26/2018    K 3 8 04/26/2018    CO2 29 04/26/2018     04/26/2018    BUN 12 04/26/2018    CREATININE 0 78 04/26/2018     Lab Results   Component Value Date    INR 0 96 04/26/2018    PROTIME 13 0 04/26/2018     Lab Results   Component Value Date    PTT 32 04/26/2018       Type and Screen:  O

## 2018-04-26 NOTE — ANESTHESIA PROCEDURE NOTES
Peripheral Block    Patient location during procedure: holding area  Start time: 4/26/2018 6:31 PM  Removal time: 4/26/2018 6:40 PM  Reason for block: procedure for pain, at surgeon's request and post-op pain management  Staffing  Anesthesiologist: Kelli Arriola  Performed: anesthesiologist   Preanesthetic Checklist  Completed: patient identified, site marked, surgical consent, pre-op evaluation, timeout performed, IV checked, risks and benefits discussed and monitors and equipment checked  Peripheral Block  Patient position: supine  Prep: ChloraPrep  Patient monitoring: continuous pulse ox and frequent blood pressure checks  Block type: supraclavicular  Laterality: right  Injection technique: single-shot  Procedures: ultrasound guided  Ultrasound permanent image saved  Local infiltration: lidocaine  Infiltration strength: 1 %  Dose: 3 mL  Needle  Needle type: Stimuplex   Needle gauge: 21 G  Needle length: 10 cm  Needle localization: anatomical landmarks and ultrasound guidance  Test dose: negative  Assessment  Injection assessment: incremental injection and local visualized surrounding nerve on ultrasound  Paresthesia pain: none  patient tolerated the procedure well with no immediate complications  Additional Notes  Ultrasound-guided supraclavicular nerve block  30 mL of 0 5% bupivacaine with 1 200,000 epinephrine  Incremental injection with no signs of intravascular injection

## 2018-04-27 PROBLEM — E46 MALNUTRITION (HCC): Status: ACTIVE | Noted: 2018-04-27

## 2018-04-27 PROBLEM — F11.10 HEROIN ABUSE (HCC): Status: ACTIVE | Noted: 2018-04-27

## 2018-04-27 PROBLEM — G47.19 EXCESSIVE DAYTIME SLEEPINESS: Status: ACTIVE | Noted: 2018-04-27

## 2018-04-27 PROBLEM — F11.20 METHADONE DEPENDENCE (HCC): Status: ACTIVE | Noted: 2018-04-27

## 2018-04-27 LAB
ANION GAP SERPL CALCULATED.3IONS-SCNC: 2 MMOL/L (ref 4–13)
ATRIAL RATE: 66 BPM
BUN SERPL-MCNC: 8 MG/DL (ref 5–25)
CALCIUM SERPL-MCNC: 8.5 MG/DL (ref 8.3–10.1)
CHLORIDE SERPL-SCNC: 105 MMOL/L (ref 100–108)
CO2 SERPL-SCNC: 30 MMOL/L (ref 21–32)
CREAT SERPL-MCNC: 0.78 MG/DL (ref 0.6–1.3)
ERYTHROCYTE [DISTWIDTH] IN BLOOD BY AUTOMATED COUNT: 12.5 % (ref 11.6–15.1)
GFR SERPL CREATININE-BSD FRML MDRD: 119 ML/MIN/1.73SQ M
GLUCOSE SERPL-MCNC: 103 MG/DL (ref 65–140)
HCT VFR BLD AUTO: 30.3 % (ref 36.5–49.3)
HGB BLD-MCNC: 9.7 G/DL (ref 12–17)
MCH RBC QN AUTO: 29.5 PG (ref 26.8–34.3)
MCHC RBC AUTO-ENTMCNC: 32 G/DL (ref 31.4–37.4)
MCV RBC AUTO: 92 FL (ref 82–98)
P AXIS: 65 DEGREES
PLATELET # BLD AUTO: 214 THOUSANDS/UL (ref 149–390)
PMV BLD AUTO: 9.7 FL (ref 8.9–12.7)
POTASSIUM SERPL-SCNC: 4 MMOL/L (ref 3.5–5.3)
PR INTERVAL: 154 MS
QRS AXIS: 75 DEGREES
QRSD INTERVAL: 92 MS
QT INTERVAL: 426 MS
QTC INTERVAL: 446 MS
RBC # BLD AUTO: 3.29 MILLION/UL (ref 3.88–5.62)
SODIUM SERPL-SCNC: 137 MMOL/L (ref 136–145)
T WAVE AXIS: 49 DEGREES
VENTRICULAR RATE: 66 BPM
WBC # BLD AUTO: 9.44 THOUSAND/UL (ref 4.31–10.16)

## 2018-04-27 PROCEDURE — 80048 BASIC METABOLIC PNL TOTAL CA: CPT | Performed by: PHYSICIAN ASSISTANT

## 2018-04-27 PROCEDURE — 93010 ELECTROCARDIOGRAM REPORT: CPT | Performed by: INTERNAL MEDICINE

## 2018-04-27 PROCEDURE — 99024 POSTOP FOLLOW-UP VISIT: CPT | Performed by: SURGERY

## 2018-04-27 PROCEDURE — 85027 COMPLETE CBC AUTOMATED: CPT | Performed by: PHYSICIAN ASSISTANT

## 2018-04-27 PROCEDURE — 99232 SBSQ HOSP IP/OBS MODERATE 35: CPT | Performed by: INTERNAL MEDICINE

## 2018-04-27 RX ORDER — METHYLPHENIDATE HYDROCHLORIDE 10 MG/1
10 TABLET ORAL 2 TIMES DAILY
Status: DISCONTINUED | OUTPATIENT
Start: 2018-04-27 | End: 2018-04-27

## 2018-04-27 RX ORDER — METHADONE HYDROCHLORIDE 10 MG/ML
137 CONCENTRATE ORAL DAILY
Status: DISCONTINUED | OUTPATIENT
Start: 2018-04-27 | End: 2018-04-27 | Stop reason: SDUPTHER

## 2018-04-27 RX ORDER — METHADONE HYDROCHLORIDE 10 MG/ML
137 CONCENTRATE ORAL DAILY
Status: DISCONTINUED | OUTPATIENT
Start: 2018-04-27 | End: 2018-04-28 | Stop reason: HOSPADM

## 2018-04-27 RX ORDER — METHYLPHENIDATE HYDROCHLORIDE 10 MG/1
10 TABLET ORAL DAILY PRN
Status: DISCONTINUED | OUTPATIENT
Start: 2018-04-27 | End: 2018-04-28 | Stop reason: HOSPADM

## 2018-04-27 RX ORDER — METHYLPHENIDATE HYDROCHLORIDE 10 MG/1
20 TABLET ORAL 2 TIMES DAILY
Status: DISCONTINUED | OUTPATIENT
Start: 2018-04-28 | End: 2018-04-28 | Stop reason: HOSPADM

## 2018-04-27 RX ADMIN — METHYLPHENIDATE HYDROCHLORIDE 10 MG: 10 TABLET ORAL at 09:13

## 2018-04-27 RX ADMIN — KETOROLAC TROMETHAMINE 15 MG: 30 INJECTION, SOLUTION INTRAMUSCULAR at 17:38

## 2018-04-27 RX ADMIN — VANCOMYCIN HYDROCHLORIDE 1000 MG: 1 INJECTION, SOLUTION INTRAVENOUS at 08:58

## 2018-04-27 RX ADMIN — KETOROLAC TROMETHAMINE 15 MG: 30 INJECTION, SOLUTION INTRAMUSCULAR at 12:34

## 2018-04-27 RX ADMIN — DEXTROSE, SODIUM CHLORIDE, AND POTASSIUM CHLORIDE 125 ML/HR: 5; .45; .15 INJECTION INTRAVENOUS at 05:56

## 2018-04-27 RX ADMIN — PIPERACILLIN SODIUM,TAZOBACTAM SODIUM 3.38 G: 3; .375 INJECTION, POWDER, FOR SOLUTION INTRAVENOUS at 12:45

## 2018-04-27 RX ADMIN — VANCOMYCIN HYDROCHLORIDE 1000 MG: 1 INJECTION, SOLUTION INTRAVENOUS at 21:16

## 2018-04-27 RX ADMIN — DEXTROSE, SODIUM CHLORIDE, AND POTASSIUM CHLORIDE 125 ML/HR: 5; .45; .15 INJECTION INTRAVENOUS at 16:28

## 2018-04-27 RX ADMIN — PIPERACILLIN SODIUM,TAZOBACTAM SODIUM 3.38 G: 3; .375 INJECTION, POWDER, FOR SOLUTION INTRAVENOUS at 17:39

## 2018-04-27 RX ADMIN — NICOTINE 1 PATCH: 21 PATCH TRANSDERMAL at 09:03

## 2018-04-27 RX ADMIN — KETOROLAC TROMETHAMINE 15 MG: 30 INJECTION, SOLUTION INTRAMUSCULAR at 05:57

## 2018-04-27 RX ADMIN — METHADONE HYDROCHLORIDE 137 MG: 10 CONCENTRATE ORAL at 13:03

## 2018-04-27 RX ADMIN — METHYLPHENIDATE HYDROCHLORIDE 10 MG: 10 TABLET ORAL at 15:33

## 2018-04-27 NOTE — PROGRESS NOTES
Patient's female visitor in the room disconnected the patient from the IV fluids, despite being told the patient needs to stay on the fluids  Patient walking the halls saying defamatory statements about nursing care and the hospital   Charge nurse and supervisor notified

## 2018-04-27 NOTE — ASSESSMENT & PLAN NOTE
I spoke with the patient's methadone clinic and acquired a copy of his treatment plan which is in his chart  Review his EKG shows a QTC of 446  Patient was informed that his next dose of methadone will be adjusted for times of that is not given to close to his dose that was just given  Patient's pain will be further managed with non opiate interventions  Patient will be referred back to his methadone clinic at the time of discharge

## 2018-04-27 NOTE — CONSULTS
Consult- Raul Lakenny 1985, 34 y o  male MRN: [id number]    Unit/Bed#: -01 Encounter: [id number]    Primary Care Provider: Ayse Alcala MD   Date and time admitted to hospital: 4/26/2018  7:18 AM      Inpatient consult to Internal Medicine  Consult performed by: Ana Choudhary ordered by: Xavier Fernando          * Abscess of right arm   Assessment & Plan    Patient status post emergent I and D of right forearm abscess  Management per surgery        Methadone dependence [facility])   Assessment & Plan    I spoke with the patient's methadone clinic and acquired a copy of his treatment plan which is in his chart  Review his EKG shows a QTC of 446  Patient was informed that his next dose of methadone will be adjusted for times of that is not given to close to his dose that was just given  Patient's pain will be further managed with non opiate interventions  Patient will be referred back to his methadone clinic at the time of discharge  Excessive daytime sleepiness   Assessment & Plan    Patient has been using Ritalin for hypersomnolence  Reviewed Pulmonary notes on this diagnosis  Will resume in a m  Heroin abuse   Assessment & Plan    Patient relapse not with heroin but with cocaine injection  Time of discharge he will be referred back to his methadone clinic  I have put his clinical schedule of methadone 137 mg daily in his chart which was affirmed with his clinic  Malnutrition ([facility] [address] )   Assessment & Plan    Malnutrition Findings:           BMI Findings: Body mass index is 19 01 kg/m²  Dietary counseling prior to discharge may be helpful for this underweight patient              VTE Prophylaxis: Enoxaparin (Lovenox)  / sequential compression device     Recommendations for Discharge:  · Methadone replacement therapy:  On discharge patient's clinic will need to be called to get him back in his slot for methadone    Would recommend dosing him in a m  with referral back to his clinic the following day  · Excessive daytime somnolence    Counseling / Coordination of Care Time: 45 minutes  Greater than 50% of total time spent on patient counseling and coordination of care  Collaboration of Care: Were Recommendations Directly Discussed with Primary Treatment Team? - Yes     History of Present Illness:    Bobby Troncoso is a 34 y o  male who is originally admitted to the surgical service due to abscess at the right forearm secondary to cocaine injection  We are consulted for assistance with patient's excessive daytime sleepiness and methadone management  Patient presented after being at [facility] on leaving against medical vise with a right arm abscess  Surgery admitted the patient to the operating room I saw him after the surgery the and communicated with the anesthesia team prior to his procedure  Patient reports only medical condition is his excessive daytime sleepiness requiring Ritalin and his methadone replacement for heroin abuse  He reports no diabetes hypertension, or chronic less a vomiting or diarrhea  Review of Systems:    Review of Systems   Constitutional: Negative for appetite change, chills, diaphoresis, fatigue, fever and unexpected weight change  HENT: Negative for dental problem, ear discharge, ear pain, facial swelling, hearing loss, mouth sores, nosebleeds and rhinorrhea  Eyes: Negative for pain, discharge, redness and visual disturbance  Respiratory: Negative for cough, chest tightness, shortness of breath and wheezing  Cardiovascular: Negative for chest pain, palpitations and leg swelling  Gastrointestinal: Negative for abdominal distention, abdominal pain, blood in stool, constipation, diarrhea, nausea and vomiting  Endocrine: Negative for cold intolerance, heat intolerance, polydipsia, polyphagia and polyuria  Genitourinary: Negative for dysuria, frequency, hematuria and urgency     Musculoskeletal: Negative for arthralgias and back pain  Skin: Positive for wound (Surgical wound intact dressed without excessive drainage through the packing)  Negative for rash  Neurological: Negative for dizziness, weakness, light-headedness, numbness and headaches  Hematological: Negative for adenopathy  Does not bruise/bleed easily  Psychiatric/Behavioral: Negative for confusion and dysphoric mood  All other systems reviewed and are negative  Past Medical and Surgical History:     Past Medical History:   Diagnosis Date    Excessive daytime sleepiness     Idiopathic hypersomnia     Tobacco use        Past Surgical History:   Procedure Laterality Date    SHOULDER SURGERY Right     titanium plate       Meds/Allergies:    all medications and allergies reviewed    Allergies:    Allergies   Allergen Reactions    Quetiapine Other (See Comments)     parodoxical reaction       Social History:     Marital Status: Single    Substance Use History:   History   Alcohol Use No     History   Smoking Status    Current Every Day Smoker    Packs/day: 2 00    Types: Cigarettes   Smokeless Tobacco    Never Used     History   Drug Use    Types: Heroin       Family History:    Patient reports father is a drug abuser and alcoholic    Physical Exam:     Vitals:   Blood Pressure: 113/84 (04/27/18 0130)  Pulse: 71 (04/27/18 0130)  Temperature: 97 8 °F (36 6 °C) (04/27/18 0130)  Temp Source: Oral (04/27/18 0130)  Respirations: 18 (04/27/18 0130)  Height: 6' (182 9 cm) (04/26/18 1403)  Weight - Scale: 63 6 kg (140 lb 3 2 oz) (04/26/18 0722)  SpO2: 98 % (04/27/18 0130)    Physical Exam    Very thin white male no acute distress slightly agitated normocephalic atraumatic pupils equal round reactive to light extraocular muscles intact mucous membranes are moist neck is supple no JVD noted records healed across his body  Chest is decreased but clear to auscultation no rhonchi rales or wheezes  Cardiovascular regular rate rhythm positive S1 and S2 no S3-S4 murmur or gallop  Abdomen soft nontender nondistended with positive bowel sounds no hepatosplenomegaly no guarding rebound  Extremities show right forearm dressing in place status post surgery otherwise no edema  Neurologically patient is awake alert slightly agitated or anxious otherwise  strengths are equal cranial nerves appear to be intact  Affect appears hypo manic    Additional Data:     Lab Results: I have personally reviewed pertinent reports  Results from last 7 days  Lab Units 04/27/18  0606 04/26/18  0756   WBC Thousand/uL 9 44  --  13 26*   HEMOGLOBIN g/dL 9 7*  --  11 5*   I STAT HEMOGLOBIN   --   < >  --    HEMATOCRIT % 30 3*  --  35 8*   PLATELETS Thousands/uL 214  --  265   NEUTROS PCT %  --   --  77*   LYMPHS PCT %  --   --  13*   MONOS PCT %  --   --  8   EOS PCT %  --   --  1   < > = values in this interval not displayed  Results from last 7 days  Lab Units 04/27/18  0606  04/26/18  0756   SODIUM mmol/L 137  --  136   POTASSIUM mmol/L 4 0  --  3 8   CHLORIDE mmol/L 105  --  100   CO2 mmol/L 30  --  29   BUN mg/dL 8  --  12   CREATININE mg/dL 0 78  --  0 78   CALCIUM mg/dL 8 5  --  8 6   TOTAL PROTEIN g/dL  --   --  7 5   BILIRUBIN TOTAL mg/dL  --   --  0 50   ALK PHOS U/L  --   --  105   ALT U/L  --   --  30   AST U/L  --   --  22   GLUCOSE RANDOM mg/dL 103  --  103   GLUCOSE, ISTAT   --   < >  --    < > = values in this interval not displayed  Results from last 7 days  Lab Units 04/26/18  0756   INR  0 96         No results found for: HGBA1C    Imaging: I have personally reviewed pertinent reports  CT chest without contrast   Final Result by Chantel Manning DO (04/26 1609)      No right lung cyst or cavitary lesion to correspond to chest film finding, presumably artifactual   No consolidation  2 mm left upper lobe pulmonary nodule, of doubtful clinical significance    Based on current Fleischner Society 2017 Guidelines on incidental pulmonary nodule, in this patient who is less than 39years of age, management decisions regarding follow-up    should be made on a case by case basis, keeping in mind that infectious causes are more likely than cancer and that use of serial CT should be minimized  Workstation performed: [id number]         XR chest pa only   Final Result by Elizabeth Wheeler MD (04/26 9269)      Question of a thin-walled cyst or possibly small cavitary lesion in the right midlung overlying the right posterior 6th rib  Recommend follow-up chest CT  I personally discussed this study with Elroy Langleymigue on 4/26/2018 at 11:26 AM                   Workstation performed: [id number]         CT elbow right w contrast   Final Result by Yadi Castaneda MD (04/26 4155)   Antecubital fossa intramuscular abscess as above  The study was marked in [facility] for immediate notification  Workstation performed: [id number]             EKG, Pathology, and Other Studies Reviewed on Admission:   · EKG:  Reviewed normal sinus rhythm     ** Please Note: This note has been constructed using a voice recognition system   **

## 2018-04-27 NOTE — ASSESSMENT & PLAN NOTE
Malnutrition Findings:           BMI Findings: Body mass index is 19 01 kg/m²     Dietary counseling prior to discharge may be helpful for this underweight patient

## 2018-04-27 NOTE — POST OP PROGRESS NOTES
Patient is doing well  Pain controlled improved from preoperative  Denies fevers or chills  Sensation intact in the hand improved motion at the elbow  Outer dressing was changed overnight due to some saturation  Micro shows gram-positive gram-negative, added Zosyn for broader coverage  Plan to remove packing tomorrow replace loosely, discharge home on broad-spectrum antibiotics, likely discharge tomorrow    Eric Ruvalcaba MD

## 2018-04-27 NOTE — ASSESSMENT & PLAN NOTE
Patient relapse not with heroin but with cocaine injection  Time of discharge he will be referred back to his methadone clinic  I have put his clinical schedule of methadone 137 mg daily in his chart which was affirmed with his clinic

## 2018-04-27 NOTE — CASE MANAGEMENT
Initial Clinical Review    Admission: Date/Time/Statement: 4/26/18 @ 1033     Orders Placed This Encounter   Procedures    Inpatient Admission     Standing Status:   Standing     Number of Occurrences:   1     Order Specific Question:   Admitting Physician     Answer:   Erum Pearson [[id number]]     Order Specific Question:   Level of Care     Answer:   Med Surg [[id number]]     Order Specific Question:   Estimated length of stay     Answer:   More than 2 Midnights     Order Specific Question:   Certification     Answer:   I certify that inpatient services are medically necessary for this patient for a duration of greater than two midnights  See H&P and MD Progress Notes for additional information about the patient's course of treatment  ED: Date/Time/Mode of Arrival:   ED Arrival Information     Expected Arrival Acuity Means of Arrival Escorted By Service Admission Type    - 4/26/2018 07:07 Urgent Walk-In Family Member Surgery-General Urgent    Arrival Complaint    ABSCESS          Chief Complaint:   Chief Complaint   Patient presents with    Abscess     pt left [address]  was supposed to have surgery to drain abscess on right arm  would like 2nd opinion       History of Illness: Vidal Patrick is a 34y o  year old male who presents with pain and swelling in crease of right arm that started 6 days ago  Pt was admitted to [facility] yesterday and signed out [place] last night where they were planning I & D washout  he was given IV antibiotic and much of the redness has resolved but not pain and swelling  Pt reports to our ED with same complaints of pain and swelling  He has a h o  IV drug abuse and on methadone 137 mg daily  Rosella Goldmann He recently relapsed with IV heroin  Last use was 3 days ago He denies weakness or pain in right hand, fever or chills  He denies SOB, chest pain  He  can not straighten his arm out because of pain  No loss of motor in hand     PMH:  Smoker, IVDA heroin, on Methadone, with recent relapse  Hypersomnia, No surgical history    PE : Patient is moderate erythema and swelling circumferentially of his right proximal forearm he has moderate swelling in this area including his right elbow into distal arm, he is holding his elbow in a flexed position he has approximately 5-10 degrees of movement otherwise elbow is frozen no lymphangitis distal hands     ED Vital Signs:   ED Triage Vitals [04/26/18 0722]   Temperature Pulse Respirations Blood Pressure SpO2   98 8 °F (37 1 °C) 73 17 137/80 98 %      Temp Source Heart Rate Source Patient Position - Orthostatic VS BP Location FiO2 (%)   Oral Monitor Sitting Left arm --      Pain Score       Worst Possible Pain        Wt Readings from Last 1 Encounters:   04/26/18 63 6 kg (140 lb 3 2 oz)       Vital Signs (abnormal): wnl    Abnormal Labs/Diagnostic Test Results: CRP  60 8, sed rate 36, alb 3 3, wbc 13 26, H&H   11 5  35 8  CXR - Question of a thin-walled cyst or possibly small cavitary lesion in the right midlung overlying the right posterior 6th rib   Recommend follow-up chest CT  CT elbow - Antecubital fossa intramuscular abscess as above  ED Treatment:   Medication Administration from 04/26/2018 0707 to 04/26/2018 1133       Date/Time Order Dose Route Action Action by Comments     04/26/2018 0900 sodium chloride 0 9 % bolus 1,000 mL 0 mL Intravenous Stopped Glenys Singh RN      04/26/2018 0758 sodium chloride 0 9 % bolus 1,000 mL 1,000 mL Intravenous New Bag Glenys Singh RN      04/26/2018 1021 vancomycin (VANCOCIN) IVPB (premix) 1,000 mg 0 mg/kg Intravenous Stopped Glenys Singh RN      04/26/2018 0827 vancomycin (VANCOCIN) IVPB (premix) 1,000 mg 1,000 mg Intravenous [address] Glenys Singh RN      04/26/2018 0819 iohexol (OMNIPAQUE) 350 MG/ML injection (MULTI-DOSE) 100 mL 100 mL Intravenous Given Maggie Pa           Past Medical/Surgical History:    Active Ambulatory Problems     Diagnosis Date Noted    No Active Ambulatory Problems Resolved Ambulatory Problems     Diagnosis Date Noted    No Resolved Ambulatory Problems     Past Medical History:   Diagnosis Date    Excessive daytime sleepiness     Idiopathic hypersomnia     Tobacco use        Admitting Diagnosis: Abscess [L02 91]  Abscess of right arm [L02 413]  Cellulitis of right arm [L03 113]  IV drug abuse [F19 10]  Substance abuse requiring supervised withdrawal [F19 90]    Age/Sex: 34 y o  male    Assessment/Plan: RIGHT Abscess of  antecubital fossa flexor musculature indicating abscess measuring 3 4 x 5 1 x 7 1 cm  Thrombosed superficial vein in this region with inflammatory change consistent with thrombophlebitis    -admit for IV antibiotics   Cont Vanco  -I & D of abscess in OR  -NPO   -non narcotic pain control  - CXR, resulted in possible septic emboli of right lung, CT to be obtained         Admission Orders:  Scheduled Meds:   Current Facility-Administered Medications:  acetaminophen 650 mg Oral Q6H PRN Roberta Coughlin PA-C    dextrose 5 % and sodium chloride 0 45 % with KCl 20 mEq/L 125 mL/hr Intravenous Continuous Roberta Coughlin PA-C Last Rate: 125 mL/hr (04/27/18 0556)   docusate sodium 100 mg Oral BID Roberta Coughlin PA-C    enoxaparin 40 mg Subcutaneous Daily Roberta Coughlin PA-C    ketorolac 15 mg Intravenous Q6H [address] Roberta Coughlin PA-C    lactated ringers 75 mL/hr Intravenous Continuous Jenna Gupta CRNA    magnesium hydroxide 30 mL Oral Daily PRN silkfredcarolina Stratton PA-C    methadone 137 mg Oral Daily Amado Thomas MD    methylphenidate 10 mg Oral Daily PRN Amado Thomas MD    methylphenidate 10 mg Oral BID Amado Thomas MD    nicotine 1 patch Transdermal Daily Roberta Coughlin PA-C    ondansetron 4 mg Intravenous Q6H PRN silkfredcarolina Stratton PA-C    vancomycin 15 mg/kg Intravenous Q12H Chloe Nielsen MD      Continuous Infusions:   dextrose 5 % and sodium chloride 0 45 % with KCl 20 mEq/L 125 mL/hr Last Rate: 125 mL/hr (04/27/18 0556)   lactated ringers 75 mL/hr PRN Meds:   acetaminophen    magnesium hydroxide    methylphenidate    ondansetron     Reg diet   Wound care   Consult IM   Up and OOB as chinedu   Inc spirom   Consult acute pain care   Aqua K pad OR I&D   4/27 cbc , bmp   H&H   9 7  30 3    OP note 4/26  INCISION AND DRAINAGE (I&D) EXTREMITY, washout and packing (Right)     Critical Care consult   Assessment:   34y o  year old male Status Post I and D of right antecubital fossa abscess POD # 0   Patient has history of opioid abuse and is maintained on methadone replacement  Admitted for right arm abscess     Plan:   1  Recommend administration of maintenance methadone dosing 137 mg daily  2  Received supraclavicular nerve blockade with 0 5% bupivacaine pre-operative  3  Recommend nonpharmacologic measures including cold and elevation  4  Unless medically contraindicated recommend addition of scheduled acetaminophen 650 mg PO Q 6 hours  5  Unless medically contraindicated recommend addition of scheduled ketorolac 15 mg IV Q 6 hours  6  Do not give additional opioids either enteral or parenteral    IM consult 4/26         * Abscess of right arm   Assessment & Plan     Patient status post emergent I and D of right forearm abscess  Management per surgery          Methadone dependence [facility])   Assessment & Plan     I spoke with the patient's methadone clinic and acquired a copy of his treatment plan which is in his chart  Review his EKG shows a QTC of 446  Patient was informed that his next dose of methadone will be adjusted for times of that is not given to close to his dose that was just given  Patient's pain will be further managed with non opiate interventions  Patient will be referred back to his methadone clinic at the time of discharge           Excessive daytime sleepiness   Assessment & Plan     Patient has been using Ritalin for hypersomnolence  Reviewed Pulmonary notes on this diagnosis    Will resume in a m           Heroin abuse Assessment & Plan     Patient relapse not with heroin but with cocaine injection  Time of discharge he will be referred back to his methadone clinic  I have put his clinical schedule of methadone 137 mg daily in his chart which was affirmed with his clinic           Malnutrition ([address] )   Assessment & Plan     Malnutrition Findings:            BMI Findings: Body mass index is 19 01 kg/m²  Dietary counseling prior to discharge may be helpful for this underweight patient                   VTE Prophylaxis: Enoxaparin (Lovenox)  / sequential compression device      Recommendations for Discharge:  · Methadone replacement therapy:  On discharge patient's clinic will need to be called to get him back in his slot for methadone  Would recommend dosing him in a m  with referral back to his clinic the following day    · Excessive daytime somnolence

## 2018-04-27 NOTE — CONSULTS
Consultation - Anesthesia Acute Pain Management   Ugo lOea 34 y o  male MRN: [id number]  Unit/Bed#: [facility] Encounter: [id number]               Admission Diagnosis:  Abscess [L02 91]     Consult Time:  30 minutes    Consult for pain management per surgeon's request     DOS: 04/26/18      Assessment/Plan     Assessment:   34y o  year old male Status Post I and D of right antecubital fossa abscess POD # 0   Patient has history of opioid abuse and is maintained on methadone replacement  Admitted for right arm abscess    Plan:   1  Recommend administration of maintenance methadone dosing 137 mg daily  2  Received supraclavicular nerve blockade with 0 5% bupivacaine pre-operative  3  Recommend nonpharmacologic measures including cold and elevation  4  Unless medically contraindicated recommend addition of scheduled acetaminophen 650 mg PO Q 6 hours  5  Unless medically contraindicated recommend addition of scheduled ketorolac 15 mg IV Q 6 hours  6  Do not give additional opioids either enteral or parenteral    Pain Service to follow  Contact [id number] with questions or concerns  Discussed with Dr Donavan Bryant  History of Present Illness    Admit Date:  4/26/2018  Hospital Day:  0 days  Primary Service:  Surgery-General  Attending Provider:  Juan Rios MD  Physician Requesting Consult: Juan Rios MD  Reason for Consult / Principal Problem: ACUTE POSTOPERATIVE PAIN CONTROL  Hx and PE limited by: none  Abscess   This is a new problem  The current episode started in the past 7 days  The problem occurs constantly  The problem has been gradually worsening  Associated symptoms include arthralgias, diaphoresis, a fever, joint swelling, myalgias and a rash  Pertinent negatives include no neck pain, numbness or vomiting  Exacerbated by: movement  He has tried acetaminophen, NSAIDs and position changes for the symptoms  The treatment provided no relief         Review of Systems   Constitutional: Positive for diaphoresis and fever  Gastrointestinal: Negative for vomiting  Musculoskeletal: Positive for arthralgias, joint swelling and myalgias  Negative for neck pain  Skin: Positive for rash  Neurological: Negative for numbness  All other systems reviewed and are negative  Pain History:  Pain History:  Right arm pain related to recent IV drug use 7 days ago  Current pain location(s):   Pain Scale:   8/10  Severity:  NRS  Quality:  Sharp  Aggravating and alleviating factors: Alleviated by cold aggravated by movement  Pain Management Physician:  [address] RUFUS ANGULO   Pain Relief Goal:  4  Treatment History:  Methadone maintenance  Current Analgesic regimen:  137 mg of methadone q day    Historical Information   Past Medical History:   Diagnosis Date    Excessive daytime sleepiness     Idiopathic hypersomnia     Tobacco use      Past Surgical History:   Procedure Laterality Date    SHOULDER SURGERY Right     titanium plate     Social History   History   Alcohol Use No     History   Drug Use    Types: Heroin     History   Smoking Status    Current Every Day Smoker    Packs/day: 2 00    Types: Cigarettes   Smokeless Tobacco    Never Used       Meds/Allergies   all current active meds have been reviewed    Allergies   Allergen Reactions    Quetiapine Other (See Comments)     parodoxical reaction       Objective   /79   Pulse 60   Temp 98 5 °F (36 9 °C) (Oral)   Resp 16   Ht 6' (1 829 m)   Wt 63 6 kg (140 lb 3 2 oz)   SpO2 100%   BMI 19 01 kg/m²   Temp:  [98 5 °F (36 9 °C)-98 8 °F (37 1 °C)] 98 5 °F (36 9 °C)  HR:  [60-73] 60  Resp:  [16-18] 16  BP: (121-137)/(74-80) 133/79    Intake/Output Summary (Last 24 hours) at 04/26/18 2053  Last data filed at 04/26/18 2046   Gross per 24 hour   Intake              500 ml   Output                0 ml   Net              500 ml       Physical Exam   Constitutional: He is oriented to person, place, and time   He appears well-developed and well-nourished  No distress  HENT:   Head: Normocephalic and atraumatic  Eyes: Conjunctivae are normal  Pupils are equal, round, and reactive to light  Neck: Normal range of motion  Neck supple  Cardiovascular: Normal rate and regular rhythm  Pulmonary/Chest: Effort normal and breath sounds normal    Abdominal: Soft  Bowel sounds are normal    Musculoskeletal: He exhibits edema  Arms:  Neurological: He is alert and oriented to person, place, and time  Skin: Skin is warm and dry  Nursing note and vitals reviewed  Lab Results: I have personally reviewed pertinent labs  Imaging Studies: I have personally reviewed pertinent reports  EKG, Pathology, and Other Studies: I have personally reviewed pertinent reports  Counseling / Coordination of Care  Total floor / unit time spent today 30 minutes minutes  Greater than 50% of total time was spent with the patient and / or family counseling and / or coordination of care  A description of the counseling / coordination of care:  Verification of chronic opioids    Discussion between Internal Medicine and surgery regarding acute on chronic pain plan    SIGNATURE: Joelle Summers DO  DATE: April 26, 2018  TIME: 8:53 PM

## 2018-04-27 NOTE — ASSESSMENT & PLAN NOTE
Patient has been using Ritalin for hypersomnolence  Reviewed Pulmonary notes on this diagnosis  Will resume in a m

## 2018-04-27 NOTE — PROGRESS NOTES
Progress Note - Anesthesia Acute Pain Management    Lindsey Severino 34 y o  male MRN: [id number]  Unit/Bed#: -01 Encounter: [id number]    Assessment:   Principal Problem:    Abscess of right arm  Active Problems:    Heroin abuse    Methadone dependence ([facility] [address] )    Excessive daytime sleepiness    Malnutrition ([facility] [address] )      34y o  year old male Status Post I and D of right antecubital fossa abscess POD # 1   Patient has history of tobacco abuse, cocaine abuse, & opioid abuse and is maintained on methadone replacement  Admitted for right arm abscess management  Culture data remarkable for polymicrobial infection, microbiology pending at this time blood cultures pending      Plan:   1  Recommend continuation of maintenance methadone dosing 137 mg daily  2  Supraclavicular nerve blockade with 0 5% bupivacaine has appropriately resolved  3   Recommend nonpharmacologic measures including cold and elevation  4  Unless medically contraindicated recommend addition of scheduled acetaminophen 650 mg PO Q 6 hours  5  Unless medically contraindicated recommend addition of scheduled ketorolac 15 mg IV Q 6 hours  6  Do not give additional opioids either enteral or parenteral  7  Antibiotic management per Crystal  Internal Medicine and surgical services  8  Anemia due to acute blood loss superimposed on chronic anemia, management per the primary service  9  Recommend return to drug rehabilitation following discharge for continued treatment for polysubstance abuse  Acute pain service to sign off  Re-consult with questions/concerns  Total time spent today 15 minutes  Greater than 50% of total time was spent with the patient and / or family counseling and / or coordination of care   A description of the counseling / coordination of care: discussing analgesic plan and expectations regarding analgesia     Pain History  Current pain location(s):  Right arm  Pain Scale:   6/10  Severity:  Numeric Scale  Quality: Sharp  Current Analgesic regimen:  Acetaminophen is ordered as needed, ketorolac is ordered scheduled, maintenance methadone ordered  24 hour history:  Underwent I&D  Patient reports satisfactory analgesia overnight  Reports that although his pain scores are documented as 6 to 8, he is happy with his analgesia  Ice and elevation and pharmacologic interventions are working appropriately for him  Block has worn off appropriately  Meds/Allergies   all current active meds have been reviewed    Allergies   Allergen Reactions    Quetiapine Other (See Comments)     parodoxical reaction       Objective     Temp:  [96 7 °F (35 9 °C)-98 5 °F (36 9 °C)] 97 7 °F (36 5 °C)  HR:  [49-78] 78  Resp:  [10-18] 18  BP: ()/(53-88) 122/88      Intake/Output Summary (Last 24 hours) at 04/27/18 1205  Last data filed at 04/27/18 0556   Gross per 24 hour   Intake          1737 92 ml   Output                0 ml   Net          1737 92 ml           Physical Exam: /88 (BP Location: Left arm)   Pulse 78   Temp 97 7 °F (36 5 °C) (Oral)   Resp 18   Ht 6' (1 829 m)   Wt 63 6 kg (140 lb 3 2 oz)   SpO2 99%   BMI 19 01 kg/m²   Gen: Well appearing and well nourished, NAD  Skin: Warm, Dry   HEENT:  PERRLA, EOMI  CV: RRR, strong peripheral pulses  Pul: Symmetric chest rise  Abd: Soft, non-tender, non-distended  Ext:  Right arm in sling, expected paresthesias from supraclavicular block have resolved    Lab Results:  Lab Results   Component Value Date    WBC 9 44 04/27/2018    HGB 9 7 (L) 04/27/2018    HCT 30 3 (L) 04/27/2018    MCV 92 04/27/2018     04/27/2018     Lab Results   Component Value Date    GLUCOSE 103 04/27/2018    CALCIUM 8 5 04/27/2018     04/27/2018    K 4 0 04/27/2018    CO2 30 04/27/2018     04/27/2018    BUN 8 04/27/2018    CREATININE 0 78 04/27/2018     Imaging Studies: I have personally reviewed pertinent reports      EKG, Pathology, and Other Studies: I have personally reviewed pertinent reports  Micro -Wound:  +3 poly, +3 GNR, +2 GPC   BCx pending  SIGNATURE: Gabriela Restrepo DO  DATE: April 27, 2018  TIME: 12:05 PM

## 2018-04-27 NOTE — OP NOTE
OPERATIVE REPORT  PATIENT NAME: Juan David Jaimes    :  1985  MRN: [id number]  Pt Location: [facility] OR ROOM 04    SURGERY DATE: 2018    Surgeon(s) and Role:     * Malu Rowland MD - Primary    Preop Diagnosis:  Abscess of right arm [L02 413]    Post-Op Diagnosis Codes:     * Abscess of right arm [L02 413]    Procedure(s) (LRB):  INCISION AND DRAINAGE (I&D) EXTREMITY, washout and packing (Right)    Specimen(s):  ID Type Source Tests Collected by Time Destination   A : right arm wound swabs Tissue Arm, Right ANAEROBIC CULTURE AND GRAM STAIN, CULTURE, TISSUE AND GRAM STAIN Malu Rowland MD 2018        Estimated Blood Loss:   Minimal    Drains:       Anesthesia Type:   General/LMA    Operative Indications:  Abscess of right arm [L02 413]  IV drug abuser with recent injection in the right arm  Seen and evaluated at [facility] with antibiotics improving cellulitis  Failure to completely resolve  Re-presented CT showing focal fluid collection persistent induration of the arm    Operative Findings:  Debroah Sow purulence overlying the brachioradialis just distal to elbow joint  Involvement of the underlying muscle cavity opened in its entirety irrigated packed with gauze dry gauze  No neurovascular involvement  Complications:   None    Procedure and Technique:  Procedures performed on the patient's stretcher  Anesthesia was introduced with no complication  Patient was placed into the supine position with right arm extended position  Surgical site was prepped and draped in a sterile fashion  Preop timeout was performed with all members of the surgical staff present, all agreed on the patient identifiers as well as the procedure to be performed  Initial incision was made 15 blade scalp over site of maximal induration palpable fluctuance  Sherman purulence was encounter aerobic and anaerobic cultures were taken  Excision was extended opening the entirety of the abscess cavity   Cavity was explored breaking up all loculations and tracks  Electrocautery was utilized to control underlying hemorrhage from on muscle as well as subcutaneous sutures  Cavity was copiously irrigated with normal saline  Wound was then tightly packed with Kerlix  Covered with the portion of dry Kerlix covered with ABD and wrapped with an additional Kerlix  Patient tolerated procedure well there were no complications  Instrument and sponge counts were correct at end at the end of the procedure       I was present for the entire procedure and A qualified resident physician was not available    Patient Disposition:  PACU     SIGNATURE: Christiane Cardozo MD  DATE: April 26, 2018  TIME: 8:45 PM

## 2018-04-27 NOTE — ANESTHESIA POSTPROCEDURE EVALUATION
Post-Op Assessment Note      CV Status:  Stable    Mental Status:  Alert    Hydration Status:  Stable    PONV Controlled:  None    Airway Patency:  Patent and adequate    Post Op Vitals Reviewed: Yes          Staff: Anesthesiologist, CRNA           BP   97/53   Temp   97 6   Pulse  52   Resp   18   SpO2 100%

## 2018-04-28 VITALS
HEART RATE: 75 BPM | RESPIRATION RATE: 18 BRPM | WEIGHT: 140.2 LBS | SYSTOLIC BLOOD PRESSURE: 129 MMHG | HEIGHT: 72 IN | TEMPERATURE: 98.7 F | DIASTOLIC BLOOD PRESSURE: 79 MMHG | BODY MASS INDEX: 18.99 KG/M2 | OXYGEN SATURATION: 96 %

## 2018-04-28 LAB — VANCOMYCIN TROUGH SERPL-MCNC: 8.8 UG/ML (ref 10–20)

## 2018-04-28 PROCEDURE — 80202 ASSAY OF VANCOMYCIN: CPT | Performed by: INTERNAL MEDICINE

## 2018-04-28 PROCEDURE — 99024 POSTOP FOLLOW-UP VISIT: CPT | Performed by: SURGERY

## 2018-04-28 RX ADMIN — NICOTINE 1 PATCH: 21 PATCH TRANSDERMAL at 09:15

## 2018-04-28 RX ADMIN — METHADONE HYDROCHLORIDE 137 MG: 10 CONCENTRATE ORAL at 09:57

## 2018-04-28 RX ADMIN — METHYLPHENIDATE HYDROCHLORIDE 20 MG: 10 TABLET ORAL at 13:30

## 2018-04-28 RX ADMIN — METHYLPHENIDATE HYDROCHLORIDE 20 MG: 10 TABLET ORAL at 06:24

## 2018-04-28 RX ADMIN — PIPERACILLIN SODIUM,TAZOBACTAM SODIUM 3.38 G: 3; .375 INJECTION, POWDER, FOR SOLUTION INTRAVENOUS at 00:03

## 2018-04-28 RX ADMIN — KETOROLAC TROMETHAMINE 15 MG: 30 INJECTION, SOLUTION INTRAMUSCULAR at 00:03

## 2018-04-28 NOTE — ASSESSMENT & PLAN NOTE
Patient status post emergent I and D of right forearm abscess  Management per surgery    Noted Zosyn added today for polymicrobial infection

## 2018-04-28 NOTE — PROGRESS NOTES
Progress Note - Lindsey Severino 1985, 34 y o  male MRN: [id number]    Unit/Bed#: -01 Encounter: [id number]    Primary Care Provider: Barrett Garcia MD   Date and time admitted to hospital: 4/26/2018  7:18 AM        * Abscess of right arm   Assessment & Plan    Patient status post emergent I and D of right forearm abscess  Management per surgery  Noted Zosyn added today for polymicrobial infection        Methadone dependence ([facility] [address] )   Assessment & Plan    I spoke with the patient's methadone clinic and acquired a copy of his treatment plan which is in his chart  Review his EKG shows a QTC of 446  Patient was informed that his next dose of methadone will be adjusted for times of that is not given to close to his dose that was just given  Patient's pain will be further managed with non opiate interventions  Patient will be referred back to his methadone clinic at the time of discharge  Excessive daytime sleepiness   Assessment & Plan    Patient has been using Ritalin for hypersomnolence  Reviewed Pulmonary notes on this diagnosis  Will resume         Heroin abuse   Assessment & Plan    Patient relapse not with heroin but with cocaine injection  Time of discharge he will be referred back to his methadone clinic  I have put his clinical schedule of methadone 137 mg daily in his chart which was affirmed with his clinic  Malnutrition ([facility] [address] )   Assessment & Plan    Malnutrition Findings:           BMI Findings: Body mass index is 19 01 kg/m²  Dietary counseling prior to discharge may be helpful for this underweight patient            VTE Pharmacologic Prophylaxis:   Pharmacologic: Enoxaparin (Lovenox)  Mechanical: Mechanical VTE prophylaxis in place  Patient Centered Rounds: I have performed bedside rounds with nursing staff today    Discussions with Specialists or Other Care Team Provider:  None  Education and Discussions with Family / Patient:   Updated patient's spouse at bedsidewith Family / Patient:  Time Spent for Care: 20 minutes  More than 50% of total time spent on counseling and coordination of care as described above  Current Length of Stay: 1day(s)  Current Patient Status: Inpatient   Certification Statement: The patient will continue to require additional inpatient hospital stay due to Treat for abscess per surgery    Discharge Plan: To b determined by surgery eDischarge Plan:  Code Status: Level 1 - Full Code    Subjective:   Patient offers no new complaints states pain is the manageable, patient's behavior impulsive and immature  Assured him that he will get his Ritalin and will adjust the dose back to his home dose  The    Objective:   Vitals:   Temp (24hrs), Av 6 °F (37 °C), Min:98 4 °F (36 9 °C), Max:98 8 °F (37 1 °C)    HR:  [72-87] 87  Resp:  [18] 18  BP: (112-119)/(72-74) 119/74  SpO2:  [98 %-100 %] 98 %  Body mass index is 19 01 kg/m²  Input and Output Summary (last 24 hours):        Intake/Output Summary (Last 24 hours) at 18 6574  Last data filed at 18 2100   Gross per 24 hour   Intake          1676 67 ml   Output                0 ml   Net          1676 67 ml       Physical Exam:     Physical Exam  General thin white male no acute distress slightly anxious sweating slightly normocephalic atraumatic pupils equal round and reactive to light extraocular muscles intact mucous membranes moist neck is supple there is no JVD no lymph nodes no carotid bruits chest is decreased but clear to auscultation no rhonchi rales or wheezes  Cardiovascular regular rate rhythm positive S1 and S2 no S3-S4 murmur or gallop  Abdomen scaphoid soft nontender nondistended with positive bowel sounds no hepatosplenomegaly no guarding or rebound  Neurologically patient is awake alert oriented cranial nerves 2-12 intact  Additional Data:   Labs:    Results from last 7 days  Lab Units 18  0606  18  0756   WBC Thousand/uL 9 44  --  13 26*   HEMOGLOBIN g/dL 9 7*  --  11 5*   I STAT HEMOGLOBIN   --   < >  --    HEMATOCRIT % 30 3*  --  35 8*   PLATELETS Thousands/uL 214  --  265   NEUTROS PCT %  --   --  77*   LYMPHS PCT %  --   --  13*   MONOS PCT %  --   --  8   EOS PCT %  --   --  1   < > = values in this interval not displayed  Results from last 7 days  Lab Units 04/27/18  0606  04/26/18  0756   SODIUM mmol/L 137  --  136   POTASSIUM mmol/L 4 0  --  3 8   CHLORIDE mmol/L 105  --  100   CO2 mmol/L 30  --  29   BUN mg/dL 8  --  12   CREATININE mg/dL 0 78  --  0 78   CALCIUM mg/dL 8 5  --  8 6   TOTAL PROTEIN g/dL  --   --  7 5   BILIRUBIN TOTAL mg/dL  --   --  0 50   ALK PHOS U/L  --   --  105   ALT U/L  --   --  30   AST U/L  --   --  22   GLUCOSE RANDOM mg/dL 103  --  103   GLUCOSE, ISTAT   --   < >  --    < > = values in this interval not displayed  Results from last 7 days  Lab Units 04/26/18  0756   INR  0 96       * I Have Reviewed All Lab Data Listed Above  * Additional Pertinent Lab Tests Reviewed:  All Labs Within Last 24 Hours Reviewed    Imaging:    Imaging Reports Reviewed Today Include:  None    Cultures:   Blood Culture:   Lab Results   Component Value Date    BLOODCX No Growth at 24 hrs  04/26/2018    BLOODCX No Growth at 24 hrs  04/26/2018     Urine Culture: No results found for: URINECX  Sputum Culture: No components found for: SPUTUMCX  Wound Culture: No results found for: WOUNDCULT    Last 24 Hours Medication List:     Current Facility-Administered Medications:  acetaminophen 650 mg Oral Q6H PRN Roberta Coughlin PA-C    dextrose 5 % and sodium chloride 0 45 % with KCl 20 mEq/L 125 mL/hr Intravenous Continuous Roberta Coughlin PA-C Last Rate: 125 mL/hr (04/27/18 1628)   docusate sodium 100 mg Oral BID Roberta Coughlin PA-C    enoxaparin 40 mg Subcutaneous Daily Roberta Coughlin PA-C    ketorolac 15 mg Intravenous Q6H [address] Roberta Coughlin PA-C    lactated ringers 75 mL/hr Intravenous Continuous Joan Villanueva CRNA    magnesium hydroxide 30 mL Oral Daily PRN Nisha Burroughs PA-C    methadone 137 mg Oral Daily Nisha Burroughs PA-C    methylphenidate 10 mg Oral Daily PRN Arnol Mcgee MD    methylphenidate 20 mg Oral BID Arnol Mcgee MD    nicotine 1 patch Transdermal Daily Roberta Coughlin PA-C    ondansetron 4 mg Intravenous Q6H PRN Nisha Burroughs PA-C    piperacillin-tazobactam 3 375 g Intravenous Q6H Geo Rosa MD Last Rate: 3 375 g (04/28/18 0003)   vancomycin 15 mg/kg Intravenous Q12H Geo Rosa MD Last Rate: 1,000 mg (04/27/18 2116)        Today, Patient Was Seen By: Arnol Mcgee MD    ** Please Note: Dragon 360 Dictation voice to text software may have been used in the creation of this document   **

## 2018-04-28 NOTE — DISCHARGE INSTRUCTIONS
Shower daily, rinse open wound with shower water  Cover with wet to dry dressing daily  Continue antibiotics until they are finished  Call the office if you develop fever, chills, pain in arm, pus draining, nausea or vomiting  Call the office for appointment in 2 weeks   [phone]

## 2018-04-28 NOTE — ASSESSMENT & PLAN NOTE
Patient has been using Ritalin for hypersomnolence  Reviewed Pulmonary notes on this diagnosis    Will resume

## 2018-04-28 NOTE — PROGRESS NOTES
Patient reported IV came out and is on IV fluids and IV antibiotics  Patient does not want another IV placed because he said he was told fluids and antibiotics would be discontinued today  Dr Audrey Montenegro made aware

## 2018-04-28 NOTE — DISCHARGE SUMMARY
Discharge Summary - Bobby Troncoso 34 y o  male MRN: [id number]    Unit/Bed#: -01 Encounter: [id number]    Admission Date:   Admission Orders     Ordered        04/26/18 1044  Inpatient Admission  Once               Admitting Diagnosis: Abscess [L02 91]  Abscess of right arm [L02 413]  Cellulitis of right arm [L03 113]  IV drug abuse [F19 10]  Substance abuse requiring supervised withdrawal [F19 90]    HPI:  76-year-old male who presented with pain and swelling on the right arm which started 6 days ago prior to admission  The patient was admitted to [facility] yesterday and signed out against medical advise last night where they were planning incision and drainage and washout  He was put on antibiotics and much of the redness had resolved but not pain and swelling  The patient reports to our ED with same complains of pain and swelling  He also has a history of IV heroin abuse and he is on methadone  The patient recently relapse on IV heroin  Procedures Performed:  Incision and drainage of right arm abscess and washout    Summary of Hospital Course: The patient was admitted to the hospital, place on IV antibiotics, he underwent incision and drainage of the abscess the day of admission  The next 2 days the patient did fairly well with minimal complaints from the right arm, there were no neurovascular deficit of the right upper extremity  The packing was removed without any issues  The patient was given specific instructions how to take care of the wound  Cultures revealed gram-positive cocci and gram-negative rods  Gram-positive causes were Streptococcus  The patient was placed on wide spectrum IV antibiotics cover gram-positive and gram-negative  He was sent home on Augmentin  Significant Findings, Care, Treatment and Services Provided:  IV fluids, white spectrum IV antibiotics, incision and drainage of a large abscess from the right arm      Complications:  None    Discharge Diagnosis:  Abscess from the right arm secondary to IV drug abuse    Resolved Problems  Date Reviewed: 4/26/2018    None          Condition at Discharge: fair         Discharge instructions/Information to patient and family:   See after visit summary for information provided to patient and family  Provisions for Follow-Up Care:  See after visit summary for information related to follow-up care and any pertinent home health orders  PCP: Sola Kennedy MD    Disposition: Home    Planned Readmission: No    Discharge Statement   I spent 30 minutes discharging the patient  This time was spent on the day of discharge  I had direct contact with the patient on the day of discharge  Additional documentation is required if more than 30 minutes were spent on discharge  Discharge Medications:  See after visit summary for reconciled discharge medications provided to patient and family

## 2018-04-29 LAB — BACTERIA SPEC ANAEROBE CULT: ABNORMAL

## 2018-04-30 LAB
BACTERIA TISS AEROBE CULT: ABNORMAL
BACTERIA TISS AEROBE CULT: ABNORMAL
GRAM STN SPEC: ABNORMAL

## 2018-05-01 LAB
BACTERIA BLD CULT: NORMAL
BACTERIA BLD CULT: NORMAL

## 2018-05-11 ENCOUNTER — OFFICE VISIT (OUTPATIENT)
Dept: PULMONOLOGY | Facility: CLINIC | Age: 33
End: 2018-05-11
Payer: MEDICARE

## 2018-05-11 VITALS
HEIGHT: 72 IN | OXYGEN SATURATION: 98 % | SYSTOLIC BLOOD PRESSURE: 120 MMHG | WEIGHT: 135 LBS | HEART RATE: 88 BPM | DIASTOLIC BLOOD PRESSURE: 90 MMHG | BODY MASS INDEX: 18.28 KG/M2

## 2018-05-11 DIAGNOSIS — G47.19 EXCESSIVE DAYTIME SLEEPINESS: Primary | ICD-10-CM

## 2018-05-11 DIAGNOSIS — G47.11 IDIOPATHIC HYPERSOMNIA: ICD-10-CM

## 2018-05-11 DIAGNOSIS — F11.20 METHADONE DEPENDENCE (HCC): ICD-10-CM

## 2018-05-11 PROCEDURE — 99214 OFFICE O/P EST MOD 30 MIN: CPT | Performed by: INTERNAL MEDICINE

## 2018-05-11 NOTE — PROGRESS NOTES
Assessment/Plan:   Diagnoses and all orders for this visit:    Excessive daytime sleepiness    Idiopathic hypersomnia    Methadone dependence (HCC)      Excessive daytime sleepiness, probably multifactorial secondary to his idiopathic hypersomnia,  Also probably contributed by his methadone,  He does state that the Ritalin is helping him to stay awake, and that he has 5 children ranging from 16 years the youngest one is 3years old, and he has to keep himself awake to do the daily chores at home and help with the kids  I discussed with him the methylphenidate is not a good medication to take does have side effects which she does understand, immediately need to know what the cause of the excessive daytime sleepiness is  Since it's been more than a year that he had a diagnostic multiple sleep latency testing done we will repeat the testing and follow-up  In the meantime I have given him a prescription for the methylphenidate for this month alone  Cautioned against driving when sleepy  Today he epworth  sleepiness score is 11  We will see him back after the above testing  Return in about 4 weeks (around 6/8/2018)  All questions are answered to the patient's satisfaction and understanding  He verbalizes understanding  He is encouraged to call with any further questions or concerns  Portions of the record may have been created with voice recognition software  Occasional wrong word or "sound a like" substitutions may have occurred due to the inherent limitations of voice recognition software  Read the chart carefully and recognize, using context, where substitutions have occurred  ______________________________________________________________________    Chief Complaint: No chief complaint on file        Patient ID: Nichole Rubio is a 34 y o  y o  male has a past medical history of Excessive daytime sleepiness; Idiopathic hypersomnia; and Tobacco use     5/11/2018  Patient is a 71-year-old gentleman, who is currently not working, states he is disabled because of psychiatric issues,  When initially seen he was seen for excessive daytime sleepiness, for which he had a diagnostic and the CT done which demonstrated no evidence of any obstructive sleep apnea no periodic limb movement disorder, and the necessity demonstrated objective evidence of sleepiness for which he was placed on methylphenidate which he is currently using  He has been on this medication for almost a year now, recently he was in the hospital at [address] [place] [address], for an abscess on the right arm I have reviewed the documents with states he's methadone dependent, and also had heroine abuse and cocaine abuse  Which he is currently denying when I ask him  Review of Systems   Constitutional: Positive for fatigue  Negative for appetite change, chills, diaphoresis, fever and unexpected weight change  HENT: Negative for congestion, ear discharge, ear pain, nosebleeds, postnasal drip, rhinorrhea, sinus pain, sore throat and voice change  Eyes: Negative for pain, discharge and visual disturbance  Respiratory: Negative for apnea, cough, choking, chest tightness, shortness of breath, wheezing and stridor  Cardiovascular: Negative for chest pain, palpitations and leg swelling  Gastrointestinal: Negative for abdominal pain, blood in stool, constipation, diarrhea and vomiting  Endocrine: Negative for cold intolerance, heat intolerance, polydipsia, polyphagia and polyuria  Genitourinary: Negative for difficulty urinating and dysuria  Musculoskeletal: Negative for arthralgias and neck pain  Skin: Negative for pallor and rash  Allergic/Immunologic: Negative for environmental allergies and food allergies  Neurological: Negative for dizziness, speech difficulty, weakness and light-headedness  Excessive daytime sleepiness,   Hematological: Negative for adenopathy  Does not bruise/bleed easily     Psychiatric/Behavioral: Negative for agitation, confusion and sleep disturbance  The patient is not nervous/anxious  Smoking history: He reports that he has been smoking Cigarettes  He has been smoking about 2 00 packs per day  He has never used smokeless tobacco     The following portions of the patient's history were reviewed and updated as appropriate: allergies, current medications, past family history, past medical history, past social history, past surgical history and problem list       There is no immunization history on file for this patient  Current Outpatient Prescriptions   Medication Sig Dispense Refill    methadone (METHADOSE) 40 MG disintegrating tablet Take 137 mg by mouth daily in the early morning        methylphenidate (RITALIN) 10 mg tablet One tab at 3PM if needed 30 tablet 0    methylphenidate (RITALIN) 20 MG tablet One tab 6am and one at10am 60 tablet 0     No current facility-administered medications for this visit  Allergies: Quetiapine    Objective:  Vitals:    05/11/18 1030   BP: 120/90   Pulse: 88   SpO2: 98%   Weight: 61 2 kg (135 lb)   Height: 6' (1 829 m)   Oxygen Therapy  SpO2: 98 %    Wt Readings from Last 3 Encounters:   05/11/18 61 2 kg (135 lb)   04/26/18 63 6 kg (140 lb 3 2 oz)   11/09/17 64 4 kg (142 lb)     Body mass index is 18 31 kg/m²  Physical Exam   Constitutional: He is oriented to person, place, and time  He appears well-developed and well-nourished  HENT:   Head: Normocephalic and atraumatic  Eyes: Conjunctivae are normal  Pupils are equal, round, and reactive to light  Neck: Normal range of motion  Neck supple  No JVD present  No thyromegaly present  Cardiovascular: Normal rate, regular rhythm and normal heart sounds  Exam reveals no gallop and no friction rub  No murmur heard  Pulmonary/Chest: Effort normal and breath sounds normal  No respiratory distress  He has no wheezes  He has no rales  He exhibits no tenderness     Musculoskeletal: Normal range of motion  He exhibits no edema, tenderness or deformity  Lymphadenopathy:     He has no cervical adenopathy  Neurological: He is alert and oriented to person, place, and time  Skin: Skin is warm and dry  Psychiatric: He has a normal mood and affect  Nursing note and vitals reviewed  ESS: Total score: 11  Ct Chest Without Contrast    Result Date: 4/26/2018  Narrative: CT CHEST WITHOUT IV CONTRAST INDICATION:   Abnormal chest x-ray, possible septic emboli with history of IV drug use  COMPARISON: Chest film earlier today TECHNIQUE: CT examination of the chest was performed without intravenous contrast   Axial, sagittal, and coronal 2D reformatted images were created from the source data and submitted for interpretation  Radiation dose length product (DLP) for this visit:  257 mGy-cm   This examination, like all CT scans performed in the [facility], was performed utilizing techniques to minimize radiation dose exposure, including the use of iterative reconstruction and automated exposure control  FINDINGS: Study limited by lack of IV contrast  LUNGS:  No right lung cyst or cavitary lesion to correspond to recent chest film finding which presumably reflects confluent shadows  No consolidation  2 mm left upper lobe nodule series 2/22  No endobronchial lesions  PLEURA:  Unremarkable  HEART/GREAT VESSELS:  Unremarkable for patient's age  MEDIASTINUM AND FLO:  No pathologic adenopathy as best I can determine on limited noncontrast imaging  CHEST WALL AND LOWER NECK:   Unremarkable  VISUALIZED STRUCTURES IN THE UPPER ABDOMEN:  Contrast within the renal collecting systems from recent contrast-enhanced CT extremity exam  OSSEOUS STRUCTURES:  No acute fracture or destructive osseous lesion  Plate and screws noted along the right clavicle  Impression: No right lung cyst or cavitary lesion to correspond to chest film finding, presumably artifactual   No consolidation   2 mm left upper lobe pulmonary nodule, of doubtful clinical significance  Based on current Fleischner Society 2017 Guidelines on incidental pulmonary nodule, in this patient who is less than 39years of age, management decisions regarding follow-up should be made on a case by case basis, keeping in mind that infectious causes are more likely than cancer and that use of serial CT should be minimized  Workstation performed: [id number]     Xr Chest Pa Only    Result Date: 4/26/2018  Narrative: CHEST INDICATION:     Preop  COMPARISON:  Chest x-ray from 3/21/2017 EXAM PERFORMED/VIEWS:  XR CHEST 1 VIEW FINDINGS: Cardiomediastinal silhouette appears unremarkable  There is a question of a thin-walled cyst or possibly small cavitary lesion in the right midlung overlying the right posterior 6th rib  No pneumothorax or pleural effusion  A sideplate and multiple screws are again noted transfixing the right clavicle  Inferior scalloping suggested of the right 7th rib  Impression: Question of a thin-walled cyst or possibly small cavitary lesion in the right midlung overlying the right posterior 6th rib  Recommend follow-up chest CT  I personally discussed this study with Maddie Nicolas on 4/26/2018 at 11:26 AM  Workstation performed: [id number]     Ct Elbow Right W Contrast    Result Date: 4/26/2018  Narrative: CT RIGHT ELBOW INDICATION:   large abscess/ collection, cellulitis  COMPARISON: None  TECHNIQUE: Serial axial CT images were acquired through the right elbow  Coronal and sagittal two-dimensional reformatted images were also obtained  This examination, like all CT scans performed in the [facility], was performed utilizing techniques to minimize radiation dose exposure, including the use of iterative reconstruction and automated exposure control  Rad dose 555 mGy-cm Contrast material was not utilized  FINDINGS: JOINT EFFUSION: None  OSSEOUS STRUCTURES: Anatomic alignment demonstrated   No evidence of fracture or osseous destructive lesion appreciated  TENDONS: Limited assessment by CT technique without gross evidence of tear  LIGAMENTS: Limited evaluation by CT technique without gross evidence of abnormality  MUSCULATURE: Intact  SOFT TISSUES: Peripherally enhancing collection within the antecubital fossa flexor musculature indicating abscess measuring 3 4 x 5 1 x 7 1 cm  Thrombosed superficial vein in this region with inflammatory change consistent with thrombophlebitis  Impression: Antecubital fossa intramuscular abscess as above  The study was marked in [facility] for immediate notification   Workstation performed: [id number]

## 2018-06-24 ENCOUNTER — HOSPITAL ENCOUNTER (EMERGENCY)
Facility: HOSPITAL | Age: 33
End: 2018-06-25
Attending: EMERGENCY MEDICINE | Admitting: EMERGENCY MEDICINE
Payer: MEDICARE

## 2018-06-24 DIAGNOSIS — R45.851 SUICIDAL THOUGHTS: Primary | ICD-10-CM

## 2018-06-24 LAB
AMPHETAMINES SERPL QL SCN: NEGATIVE
BARBITURATES UR QL: NEGATIVE
BENZODIAZ UR QL: POSITIVE
COCAINE UR QL: POSITIVE
ETHANOL EXG-MCNC: 0 MG/DL
METHADONE UR QL: POSITIVE
OPIATES UR QL SCN: NEGATIVE
PCP UR QL: NEGATIVE
THC UR QL: NEGATIVE

## 2018-06-24 PROCEDURE — 80307 DRUG TEST PRSMV CHEM ANLYZR: CPT | Performed by: EMERGENCY MEDICINE

## 2018-06-24 PROCEDURE — 82075 ASSAY OF BREATH ETHANOL: CPT | Performed by: EMERGENCY MEDICINE

## 2018-06-24 RX ORDER — METHYLPHENIDATE HYDROCHLORIDE 10 MG/1
40 TABLET ORAL EVERY MORNING
Status: DISCONTINUED | OUTPATIENT
Start: 2018-06-24 | End: 2018-06-25 | Stop reason: HOSPADM

## 2018-06-24 RX ORDER — NICOTINE 21 MG/24HR
21 PATCH, TRANSDERMAL 24 HOURS TRANSDERMAL ONCE
Status: COMPLETED | OUTPATIENT
Start: 2018-06-24 | End: 2018-06-25

## 2018-06-24 RX ORDER — METHYLPHENIDATE HYDROCHLORIDE 10 MG/1
10 TABLET ORAL
Status: DISCONTINUED | OUTPATIENT
Start: 2018-06-24 | End: 2018-06-25 | Stop reason: HOSPADM

## 2018-06-24 RX ADMIN — NICOTINE 21 MG: 21 PATCH, EXTENDED RELEASE TRANSDERMAL at 11:22

## 2018-06-24 RX ADMIN — METHYLPHENIDATE HYDROCHLORIDE 10 MG: 10 TABLET ORAL at 21:39

## 2018-06-24 RX ADMIN — METHYLPHENIDATE HYDROCHLORIDE 40 MG: 10 TABLET ORAL at 11:23

## 2018-06-24 NOTE — ED NOTES
Pt's significant other at bedside  Visitor belongings placed in visitor locker  Pt's significant other brought two cell phone chargers and a personal note for the pt  Cell phone chargers placed in locker SO28        Jenae Page  06/24/18 [address] [facility]  06/24/18 4975

## 2018-06-24 NOTE — ED NOTES
Pt's Significant Other is at bedside  Pt is laying in bed with the lights off at this time  Will continue to monitor        Alana Klinefelter  06/24/18 1386

## 2018-06-24 NOTE — ED NOTES
Pt is resting in bed with the lights off  No signs of distress noted at this time  Will continue to monitor        Lynn Hernández  06/24/18 4611

## 2018-06-24 NOTE — ED NOTES
Pt is laying in bed with the lights and tv off  No signs of distress noted at this time  Will continue to monitor        [facility]  06/24/18 6522

## 2018-06-24 NOTE — ED NOTES
Received report from [facility]  Pt appears to be resting  No signs of distress at this time  Will continue to monitor        Kameron Mark  06/24/18 5802

## 2018-06-24 NOTE — ED NOTES
Pt is a 28 y o  male who presented to the ED due to increased depression, anxiety, and SI with a plan to hang himself  Pt reports that he's attempted suicide multiple times in the past, by hanging, shooting himself, and overdosing  Pt has a hx of D&A use, and is now on a methadone maintenance program through Dr Melodie Bae  Pt reports that he came into the hospital last night because "he was having an episode"  Pt reports a hx of hospitalizations in the past, indicating he can't remember when and where, but it's been a few years  Pt states that he takes his medications as prescribed  Pt also admits [facility] of seeing "helicopters following him around, and gremlins coming out of corner after him"  Pt also admits to [facility] of "3 guys in his head, constantly talking to him and whispering, as well as [facility] of voices telling him to kill himself"  Pt c/o poor sleep and fair appetite  Pt willing to sign in voluntarily at this time  201 executed by physician and pt  Chief Complaint   Patient presents with    Suicidal     Pt c/o SI all week with previous SA  Pt has hx of hospitalization  Pt states 2 weeks ago he tried to hang himself  Multiple attempts to hang himself in the last 6 months  Intake Assessment completed, Safety Risk Assessment completed      Jennifer Light LMSW  06/24/18  3156

## 2018-06-24 NOTE — ED NOTES
Patient is presently resting on the bed  Visitor is at bedside  Patient has been cooperative with staff during interactions  Patient is showing no signs of distress  Will continue to monitor       Omari Solo  06/24/18 [facility]  06/24/18 0593

## 2018-06-24 NOTE — ED NOTES
Patient is resting on the bed  Visitor is still present in the room  Patient is showing no signs of distress  Will continue to monitor       Mireya Siddiqi  06/24/18 5503

## 2018-06-24 NOTE — ED NOTES
Pt currently laying in bed with significant other at bedside  No signs of distress at this time  Will continue to monitor        Andrew Walker  06/24/18 5746

## 2018-06-24 NOTE — ED NOTES
Crisis Worker is at bedside  Will obtain vitals when Pt is finished speaking with Crisis        Mikaela Austin  06/24/18 7146

## 2018-06-24 NOTE — ED NOTES
Pt Significant Other stepped out of [facility], to return later        Gina Powell  06/24/18 0908

## 2018-06-24 NOTE — ED NOTES
There are no in-network beds at [facility] at this time  Per Cindy at [facility], they do not have any beds at this time  Per Esha Moreno at [facility], they will have a crisis worker return my call       Nakul Barton LMSW  06/24/18 1953

## 2018-06-24 NOTE — ED NOTES
Pt and significant other appear to be having a verbal altercation  Will continue to monitor        Anika Tipton  06/24/18 9793

## 2018-06-24 NOTE — ED NOTES
Pt currently laying in bed and watching television  No signs of distress at this time  Will continue to monitor        Eric Natalia  06/24/18 1800

## 2018-06-24 NOTE — ED NOTES
Completed COB with [facility]  @ [facility]      Ebenezer Gipson, COLIN  06/24/18  1019

## 2018-06-24 NOTE — ED NOTES
Pt ambulated to bathroom and then back to assigned room  Pt given blankets and turned the light off  Pt asked for his Ritalin  Will follow up with RN  Appears to be resting in bed  No signs of distress  Will continue to monitor       Sherren Myron  06/24/18 1946

## 2018-06-24 NOTE — ED NOTES
Took over observation from [facility]  Pt appears to be asleep in bed  Significant Other remains in the room  No signs of distress noted at this time  Will continue visual and audio monitoring        Tong Milton  06/24/18 8335

## 2018-06-24 NOTE — ED NOTES
Patient changed into paper scrubs  Patient has been cooperative with staff  Patient has visitor at bedside  Visitor belongings are secured in visitor locker per unit policy  BAT = 0 00  Patient is aware that a urine specimen is needed  Will continue to monitor       Daniel Webster  06/24/18 0216

## 2018-06-24 NOTE — ED NOTES
Pt laying in bed  Significant Other at bedside  No signs of distress noted at this time  Will continue to monitor        Darnelle Merlin  06/24/18 0514

## 2018-06-24 NOTE — ED NOTES
Pt's Significant other left [employer], because they began to fight  Pt ambulated to bathroom to provide a urine sample        Mikaela Austin  06/24/18 6801

## 2018-06-24 NOTE — ED PROVIDER NOTES
This is a 65-year-old male who presents here today with worsening suicidal thoughts and recent pain attempts  He is pending evaluation for crisis  If he does not sign a 201, he does meet 302 criteria  He was evaluated by crisis, and was willing to sign a 201  Placement is pending       Stanislaw Iqbal MD  06/27/18 2448

## 2018-06-24 NOTE — ED NOTES
Pt's Significant Other left  Pt is laying in bed with the lights off        Tono Thomas  06/24/18 1828

## 2018-06-24 NOTE — LETTER
[address]  [facility] [address]  Dept: [phone]            HASNF TRANSFER CONSENT    NAME Nico Nguyen                                         1985                              MRN [id number]    I have been informed of my rights regarding examination, treatment, and transfer   by Dr Levine att  providers found    Benefits: Continuity of care    Risks: Potential for delay in receiving treatment      { ED EMTALA TRANSFER CHOICES:[id number]}    I authorize the performance of emergency medical procedures and treatments upon me in both transit and upon arrival at the receiving facility  Additionally, I authorize the release of any and all medical records to the receiving facility and request they be transported with me, if possible  I understand that the safest mode of transportation during a medical emergency is an ambulance and that the Hospital advocates the use of this mode of transport  Risks of traveling to the receiving facility by car, including absence of medical control, life sustaining equipment, such as oxygen, and medical personnel has been explained to me and I fully understand them  (YRIS CORRECT BOX BELOW)  [ X ]  I consent to the stated transfer and to be transported by ambulance/helicopter  [  ]  I consent to the stated transfer, but refuse transportation by ambulance and accept full responsibility for my transportation by car    I understand the risks of non-ambulance transfers and I exonerate the Hospital and its staff from any deterioration in my condition that results from this refusal     X___________________________________________    DATE  18  TIME__13:23______  Signature of patient or legally responsible individual signing on patient behalf           RELATIONSHIP TO PATIENT_________________________                                      Provider Certification    NAME Nico Nguyen  1985                              MRN [id number]    A medical screening exam was performed on the above named patient  Based on the examination:    Condition Necessitating Transfer The encounter diagnosis was Suicidal thoughts  Patient Condition: The patient has been stabilized such that within reasonable medical probability, no material deterioration of the patient condition or the condition of the unborn child(tima) is likely to result from the transfer    Reason for Transfer: Level of Care needed not available at this facility    Transfer Requirements: [address]   · Space available and qualified personnel available for treatment as acknowledged by CHAITANYA Keller @ [phone]  · Agreed to accept transfer and to provide appropriate medical treatment as acknowledged by       Dr Tammy Farooq  · Appropriate medical records of the examination and treatment of the patient are provided at the time of transfer   [address] [place] [address], [address] __A  A ___  · Transfer will be performed by qualified personnel from [facility]  and appropriate transfer equipment as required, including the use of necessary and appropriate life support measures      Provider Certification: I have examined the patient and explained the following risks and benefits of being transferred/refusing transfer to the patient/family:  General risk, such as traffic hazards, adverse weather conditions, rough terrain or turbulence, possible failure of equipment (including vehicle or aircraft), or consequences of actions of persons outside the control of the transport personnel      Based on these reasonable risks and benefits to the patient and/or the unborn child(tima), and based upon the information available at the time of the patients examination, I certify that the medical benefits reasonably to be expected from the provision of appropriate medical treatments at another medical facility outweigh the increasing risks, if any, to the individuals medical condition, and in the case of labor to the unborn child, from effecting the transfer      X____________________________________________ DATE 06/25/18        TIME_______      ORIGINAL - SEND TO MEDICAL RECORDS   COPY - SEND WITH PATIENT DURING TRANSFER

## 2018-06-24 NOTE — ED NOTES
Pt will be an AM bed search, as the bed search has been exhausted this morning       Sharonda Jeter LMSW  06/24/18  1019

## 2018-06-24 NOTE — ED NOTES
Meal tray delivered  Pt currently sitting on bed and eating while watching television  No signs of distress at this time  Will continue to monitor        Love Blanco  06/24/18 5371

## 2018-06-24 NOTE — ED PROVIDER NOTES
Pt Name: Christiane Barnett  MRN: [id number]  [place] 1985  Age/Sex: 28 y o  male  Date of evaluation: 6/24/2018  PCP: Rigoberto Morales MD    CHIEF COMPLAINT    Chief Complaint   Patient presents with    Suicidal     Pt c/o SI all week with previous SA  Pt has hx of hospitalization  Pt states 2 weeks ago he tried to hang himself  Multiple attempts to hang himself in the last 6 months  HPI    Satira Schaumann presents to the Emergency Department complaining of suicidal thoughts  He has plan to hang himself and he has attempted this multiple times  According to his sig other he is under a lot of stress right now  He has a hx of ETOH and drug abuse but now is in a methadone program           History provided by:  Patient  Suicidal   Presenting symptoms: depression, suicidal thoughts and suicide attempt    Degree of incapacity (severity): Moderate  Onset quality:  Sudden  Timing:  Constant  Progression:  Worsening  Relieved by:  Nothing  Worsened by:  Nothing  Associated symptoms: anxiety    Associated symptoms: no abdominal pain, no appetite change, no chest pain, no fatigue and no headaches          Past Medical and Surgical History    Past Medical History:   Diagnosis Date    Bipolar 1 disorder ([facility] )     Excessive daytime sleepiness     Idiopathic hypersomnia     Psychiatric disorder     [facility]     Tobacco use        Past Surgical History:   Procedure Laterality Date    INCISION AND DRAINAGE OF WOUND Right 4/26/2018    Procedure: INCISION AND DRAINAGE (I&D) EXTREMITY, washout and packing;  Surgeon: Alejandra Stauffer MD;  [facility];  Service: General    SHOULDER SURGERY Right     titanium plate       History reviewed  No pertinent family history      Social History   Substance Use Topics    Smoking status: Current Every Day Smoker     Packs/day: 2 00     Types: Cigarettes    Smokeless tobacco: Never Used    Alcohol use No              Allergies    Allergies   Allergen Reactions    Quetiapine Other (See Comments)     parodoxical reaction       Home Medications    Prior to Admission medications    Medication Sig Start Date End Date Taking? Authorizing Provider   methadone (METHADOSE) 40 MG disintegrating tablet Take 137 mg by mouth daily in the early morning      Historical Provider, MD   methylphenidate (RITALIN) 10 mg tablet One tab at 3PM if needed 4/12/18   Cirilo Mayfield PA-C   methylphenidate (RITALIN) 20 MG tablet One tab 6am and one at10am 4/12/18   Cirilo Mayfield PA-C           Review of Systems    Review of Systems   Constitutional: Negative for activity change, appetite change, chills, fatigue and fever  HENT: Negative for congestion, rhinorrhea, sinus pressure, sneezing, sore throat and trouble swallowing  Eyes: Negative for photophobia and visual disturbance  Respiratory: Negative for chest tightness, shortness of breath and wheezing  Cardiovascular: Negative for chest pain and leg swelling  Gastrointestinal: Negative for abdominal distention, abdominal pain, constipation, diarrhea, nausea and vomiting  Endocrine: Negative for polydipsia, polyphagia and polyuria  Genitourinary: Negative for decreased urine volume, difficulty urinating, dysuria, flank pain, frequency and urgency  Musculoskeletal: Negative for back pain, gait problem, joint swelling and neck pain  Skin: Negative for color change, pallor and rash  Allergic/Immunologic: Negative for immunocompromised state  Neurological: Negative for seizures, syncope, speech difficulty, weakness, light-headedness and headaches  Psychiatric/Behavioral: Positive for suicidal ideas  Negative for confusion  The patient is nervous/anxious  All other systems reviewed and are negative          Physical Exam      ED Triage Vitals   Temperature Pulse Respirations Blood Pressure SpO2   06/24/18 0400 06/24/18 0358 06/24/18 0358 06/24/18 0358 06/24/18 0358   97 8 °F (36 6 °C) 72 18 121/66 99 %      Temp Source Heart Rate Source Patient Position - Orthostatic VS BP Location FiO2 (%)   06/24/18 0400 06/24/18 0358 06/24/18 0358 06/24/18 0358 --   Oral Monitor Sitting Left arm       Pain Score       06/24/18 0358       No Pain               Physical Exam   Constitutional: He is oriented to person, place, and time  He appears well-developed and well-nourished  No distress  HENT:   Head: Normocephalic and atraumatic  Nose: Nose normal    Mouth/Throat: Oropharynx is clear and moist    Eyes: Conjunctivae and EOM are normal  Pupils are equal, round, and reactive to light  Neck: Normal range of motion  Neck supple  Cardiovascular: Normal rate, regular rhythm and normal heart sounds  Exam reveals no gallop and no friction rub  No murmur heard  Pulmonary/Chest: Effort normal and breath sounds normal  No respiratory distress  He has no wheezes  He has no rales  Abdominal: Soft  Bowel sounds are normal  There is no tenderness  There is no rebound and no guarding  Musculoskeletal: Normal range of motion  Neurological: He is alert and oriented to person, place, and time  Skin: Skin is warm and dry  He is not diaphoretic  Psychiatric: His behavior is normal  His mood appears anxious  He exhibits a depressed mood  He expresses suicidal ideation  He expresses no homicidal ideation  He expresses suicidal plans  Nursing note and vitals reviewed  Assessment and Plan    Lloyd Hernández is a 28 y o  male who presents with suicidal thoughts  Plan for medical clearance and Crisis eval      MDM  Number of Diagnoses or Management Options  Diagnosis management comments: Based on evaluation patient is at risk for self-harm  Patient is willing to sign 201  Would need 302 commitment if unwilling to continue with 201  Diagnostics reviewed and patient is medically cleared  Crisis has been consulted and their evaluation is pending   Care of patient transferred to incoming team         Diagnostic Results        Labs:  Radiology:    No orders to display       All radiology studies independently viewed by me and interpreted by the radiologist     Procedure    Procedures    CritCare Time      ED Course of Care and Re-Assessments       Medications - No data to display        FINAL IMPRESSION        DISPOSITION/PLAN             DO Jenae Roberts DO  06/24/18 2886

## 2018-06-24 NOTE — ED NOTES
CW asked significant other to leave, due to the verbal altercation  Pt's significant other left bedside and took belongings out of visitor locker        Karl Cadet  06/24/18 5626

## 2018-06-24 NOTE — ED NOTES
Unable to contact methadone clinic to confirm dose  Attempted to call, clinic closed       Celso [place], RN  06/24/18 9370

## 2018-06-24 NOTE — LETTER
Section I - General Information    Name of Patient: Mary Ellen River                 : 1985    Medicare #:____________________  Transport Date: 18 (PCS is valid for round trips on this date and for all repetitive trips in the 60-day range as noted below )  [address]: [address]                                                         Destination:______[facility]2W__________________________________________  Is the pt's stay covered under Medicare Part A (PPS/DRG)     (_) YES  (X_) NO  Closest appropriate facility? (_X) YES  (_) NO  If no, why is transport to more distant facility required?________________________  If hosp-hosp transfer, describe services needed at 2nd facility not available at 1st facility? _[facility]___  If hospice pt, is this transport related to pt's terminal illness? (_) YES (_) NO Describe____________________________________    Section II - Medical Necessity Questionnaire  Ambulance transportation is medically necessary only if other means of transport are contraindicated or would be potentially harmful to the patient  To meet this requirement, the patient must either be "bed confined" or suffer from a condition such that transport by means other than ambulance is contraindicated by the patient's condition   The following questions must be answered by the medical professional signing below for this form to be valid:    1)  Describe the MEDICAL CONDITION (physical and/or mental) of this patient AT [address] [place] [address] that requires the patient to be transported in an ambulance and why transport by other means is contraindicated by the patient's condition:_____________Psychosis____________________________________________________________________    2) Is the patient "bed confined" as defined below?     (_) YES  (X_) NO  To be "be confined" the patient must satisfy all three of the following conditions: (1) unable to get up from bed without Assistance; AND (2) unable to ambulate; AND (3) unable to sit in a chair or wheelchair  3) Can this patient safely be transported by car or wheelchair Rand Starring (i e , seated during transport without a medical attendant or monitoring)?   (_) YES  (_X) NO    4) In addition to completing questions 1-3 above, please check any of the following conditions that apply*:  *Note: supporting documentation for any boxes checked must be maintained in the patient's medical records  (_)Contractures   (_)Non-Healed Fractures  (_)Patient is confused (_)Patient is comatose (_)Moderate/severe pain on movement (_X)Danger to self/others  (_)IV meds/fluids required (_)Patient is combative(_)Need or possible need for restraints (_)DVT requires elevation of lower extremity  (_)Medical attendant required (_)Requires oxygen-unable to self administer (_)Special handling/isolation/infection control precautions required (_)Unable to tolerate seated position for time needed to transport (_)Hemodynamic monitoring required en route (_)Unable to sit in a chair or wheelchair due to decubitus ulcers or other wounds (_)Cardiac monitoring required en route (_)Morbid obesity requires additional personnel/equipment to safely handle patient (_)Orthopedic device (backboard, halo, pins, traction, brace, wedge, etc,) requiring special handling during transport (_)Other(specify)_______________________________________________    Section III - Signature of Physician or Healthcare Professional  I certify that the above information is true and correct based on my evaluation of this patient, and represent that the patient requires transport by ambulance and that other forms of transport are contraindicated   I understand that this information will be used by the Centers for Medicare and Medicaid Services (CMS) to support the determination of medical necessity for ambulance services, and I represent that I have personal knowledge of the patient's condition at time of transport  (_) If this box is checked, I also certify that the patient is physically or mentally incapable of signing the ambulance service's claim and that the institution with which I am affiliated has furnished care, services, or assistance to the patient  My signature below is made on behalf of the patient pursuant to 42 CFR §424 36(b)(4)  In accordance with 42 CFR §424 37, the specific reason(s) that the patient is physically or mentally incapable of signing the claim form is as follows: _________________________________________________________________________________________________________      Signature of Physician* or Healthcare Professional______________________________________________________________  Signature Date 06/25/18 (For scheduled repetitive transports, this form is not valid for transports performed more than 60 days after this date)    Printed Name & Credentials of Physician or Healthcare Professional (MD, DO, RN, etc )__A  [facility]__________  *Form must be signed by patient's attending physician for scheduled, repetitive transports   For non-repetitive, unscheduled ambulance transports, if unable to obtain the signature of the attending physician, any of the following may sign (choose appropriate option below)  (_) Physician Assistant (_)  Clinical Nurse Specialist (_)  Registered Nurse  (_)  Nurse Practitioner  Reese Boast) Discharge Planner

## 2018-06-24 NOTE — ED NOTES
Received pt care handoff from [facility]  Pt appears to be resting on his bed in [facility]  No signs of distress  Will continue to monitor       Romie Cruz  06/24/18 1916

## 2018-06-24 NOTE — ED NOTES
Pt currently laying in bed and watching television  No signs of distress at this time  Will continue to monitor        Barba Margoth  06/24/18 1040

## 2018-06-24 NOTE — ED NOTES
Call placed to [facility], and spoke with Lavern Machuca, who stated that they do not have any male beds at this time  Call placed to [address] [place] [address], and spoke with     Call placed to [facility], and spoke with Marie Miranda, who stated that they do not have any male beds today  Call placed to [facility] , spoke with     Call placed to [facility], and spoke with [facility], who stated that they do not have any beds  Call placed to [facility], spoke with Harman Oropeza, who stated that they do not have any male beds today  Call returned from Sherle Dakins at [facility], who stated that they do not have any beds  Call placed to [facility], spoke with Umesh Lopez, who stated that they do not have a bed  Call placed to [facility], spoke with  Marie Miranda, who stated that they do not have any male beds  Call placed to the [facility], spoke with Jose Marcano, who stated that they are currently full  Call placed to [facility], spoke with Guinevere Duane, who stated that they do not have any adult beds  Call placed to [address] [place] [address], and spoke with Alfred Heller, who stated that they are currently full      Chloe Whitehead, [facility]  06/24/18  5026

## 2018-06-25 ENCOUNTER — HOSPITAL ENCOUNTER (INPATIENT)
Facility: HOSPITAL | Age: 33
LOS: 3 days | Discharge: HOME | DRG: 885 | End: 2018-06-29
Attending: PSYCHIATRY & NEUROLOGY | Admitting: PSYCHIATRY & NEUROLOGY
Payer: MEDICARE

## 2018-06-25 VITALS
DIASTOLIC BLOOD PRESSURE: 86 MMHG | BODY MASS INDEX: 20.32 KG/M2 | SYSTOLIC BLOOD PRESSURE: 125 MMHG | RESPIRATION RATE: 16 BRPM | OXYGEN SATURATION: 96 % | WEIGHT: 150 LBS | TEMPERATURE: 97.8 F | HEART RATE: 75 BPM | HEIGHT: 72 IN

## 2018-06-25 DIAGNOSIS — R45.851 SUICIDAL THOUGHTS: ICD-10-CM

## 2018-06-25 DIAGNOSIS — F43.10 PTSD (POST-TRAUMATIC STRESS DISORDER): ICD-10-CM

## 2018-06-25 DIAGNOSIS — F25.0 SCHIZOAFFECTIVE DISORDER, BIPOLAR TYPE (HCC): Primary | ICD-10-CM

## 2018-06-25 PROCEDURE — 99285 EMERGENCY DEPT VISIT HI MDM: CPT

## 2018-06-25 RX ORDER — NICOTINE 21 MG/24HR
21 PATCH, TRANSDERMAL 24 HOURS TRANSDERMAL ONCE
Status: DISCONTINUED | OUTPATIENT
Start: 2018-06-25 | End: 2018-06-26 | Stop reason: SDUPTHER

## 2018-06-25 RX ORDER — LORAZEPAM 2 MG/ML
2 INJECTION INTRAMUSCULAR EVERY 6 HOURS PRN
Status: CANCELLED | OUTPATIENT
Start: 2018-06-25

## 2018-06-25 RX ORDER — BENZTROPINE MESYLATE 1 MG/1
1 TABLET ORAL EVERY 6 HOURS PRN
Status: DISCONTINUED | OUTPATIENT
Start: 2018-06-25 | End: 2018-06-29 | Stop reason: HOSPADM

## 2018-06-25 RX ORDER — MAGNESIUM HYDROXIDE/ALUMINUM HYDROXICE/SIMETHICONE 120; 1200; 1200 MG/30ML; MG/30ML; MG/30ML
30 SUSPENSION ORAL EVERY 4 HOURS PRN
Status: CANCELLED | OUTPATIENT
Start: 2018-06-25

## 2018-06-25 RX ORDER — HYDROXYZINE 50 MG/1
50 TABLET, FILM COATED ORAL EVERY 6 HOURS PRN
Status: DISCONTINUED | OUTPATIENT
Start: 2018-06-25 | End: 2018-06-29 | Stop reason: HOSPADM

## 2018-06-25 RX ORDER — BENZTROPINE MESYLATE 1 MG/ML
1 INJECTION INTRAMUSCULAR; INTRAVENOUS EVERY 6 HOURS PRN
Status: DISCONTINUED | OUTPATIENT
Start: 2018-06-25 | End: 2018-06-29 | Stop reason: HOSPADM

## 2018-06-25 RX ORDER — TRAZODONE HYDROCHLORIDE 50 MG/1
50 TABLET ORAL
Status: DISCONTINUED | OUTPATIENT
Start: 2018-06-25 | End: 2018-06-29 | Stop reason: HOSPADM

## 2018-06-25 RX ORDER — OLANZAPINE 10 MG/1
10 INJECTION, POWDER, LYOPHILIZED, FOR SOLUTION INTRAMUSCULAR EVERY 8 HOURS PRN
Status: DISCONTINUED | OUTPATIENT
Start: 2018-06-25 | End: 2018-06-29 | Stop reason: HOSPADM

## 2018-06-25 RX ORDER — HALOPERIDOL 5 MG/ML
5 INJECTION INTRAMUSCULAR EVERY 6 HOURS PRN
Status: DISCONTINUED | OUTPATIENT
Start: 2018-06-25 | End: 2018-06-29 | Stop reason: HOSPADM

## 2018-06-25 RX ORDER — OLANZAPINE 10 MG/1
10 TABLET ORAL EVERY 8 HOURS PRN
Status: DISCONTINUED | OUTPATIENT
Start: 2018-06-25 | End: 2018-06-29 | Stop reason: HOSPADM

## 2018-06-25 RX ORDER — METHADONE HYDROCHLORIDE 10 MG/ML
137 CONCENTRATE ORAL ONCE
Status: COMPLETED | OUTPATIENT
Start: 2018-06-25 | End: 2018-06-25

## 2018-06-25 RX ORDER — BENZTROPINE MESYLATE 1 MG/1
1 TABLET ORAL EVERY 6 HOURS PRN
Status: CANCELLED | OUTPATIENT
Start: 2018-06-25

## 2018-06-25 RX ORDER — NICOTINE 21 MG/24HR
21 PATCH, TRANSDERMAL 24 HOURS TRANSDERMAL ONCE
Status: CANCELLED | OUTPATIENT
Start: 2018-06-25 | End: 2018-06-25

## 2018-06-25 RX ORDER — TRAZODONE HYDROCHLORIDE 50 MG/1
50 TABLET ORAL
Status: CANCELLED | OUTPATIENT
Start: 2018-06-25

## 2018-06-25 RX ORDER — HYDROXYZINE HYDROCHLORIDE 25 MG/1
50 TABLET, FILM COATED ORAL EVERY 6 HOURS PRN
Status: CANCELLED | OUTPATIENT
Start: 2018-06-25

## 2018-06-25 RX ORDER — OLANZAPINE 10 MG/1
10 INJECTION, POWDER, LYOPHILIZED, FOR SOLUTION INTRAMUSCULAR EVERY 8 HOURS PRN
Status: CANCELLED | OUTPATIENT
Start: 2018-06-25

## 2018-06-25 RX ORDER — RISPERIDONE 1 MG/1
1 TABLET, ORALLY DISINTEGRATING ORAL
Status: CANCELLED | OUTPATIENT
Start: 2018-06-25

## 2018-06-25 RX ORDER — NICOTINE 21 MG/24HR
21 PATCH, TRANSDERMAL 24 HOURS TRANSDERMAL ONCE
Status: DISCONTINUED | OUTPATIENT
Start: 2018-06-25 | End: 2018-06-25 | Stop reason: HOSPADM

## 2018-06-25 RX ORDER — ACETAMINOPHEN 325 MG/1
650 TABLET ORAL EVERY 6 HOURS PRN
Status: CANCELLED | OUTPATIENT
Start: 2018-06-25

## 2018-06-25 RX ORDER — BENZTROPINE MESYLATE 1 MG/ML
1 INJECTION INTRAMUSCULAR; INTRAVENOUS EVERY 6 HOURS PRN
Status: CANCELLED | OUTPATIENT
Start: 2018-06-25

## 2018-06-25 RX ORDER — LORAZEPAM 2 MG/ML
2 INJECTION INTRAMUSCULAR EVERY 6 HOURS PRN
Status: DISCONTINUED | OUTPATIENT
Start: 2018-06-25 | End: 2018-06-29 | Stop reason: HOSPADM

## 2018-06-25 RX ORDER — ACETAMINOPHEN 325 MG/1
650 TABLET ORAL EVERY 6 HOURS PRN
Status: DISCONTINUED | OUTPATIENT
Start: 2018-06-25 | End: 2018-06-29 | Stop reason: HOSPADM

## 2018-06-25 RX ORDER — MAGNESIUM HYDROXIDE/ALUMINUM HYDROXICE/SIMETHICONE 120; 1200; 1200 MG/30ML; MG/30ML; MG/30ML
30 SUSPENSION ORAL EVERY 4 HOURS PRN
Status: DISCONTINUED | OUTPATIENT
Start: 2018-06-25 | End: 2018-06-29 | Stop reason: HOSPADM

## 2018-06-25 RX ORDER — HALOPERIDOL 5 MG/ML
5 INJECTION INTRAMUSCULAR EVERY 6 HOURS PRN
Status: CANCELLED | OUTPATIENT
Start: 2018-06-25

## 2018-06-25 RX ORDER — HALOPERIDOL 5 MG
5 TABLET ORAL EVERY 6 HOURS PRN
Status: DISCONTINUED | OUTPATIENT
Start: 2018-06-25 | End: 2018-06-29 | Stop reason: HOSPADM

## 2018-06-25 RX ORDER — OLANZAPINE 2.5 MG/1
10 TABLET ORAL EVERY 8 HOURS PRN
Status: CANCELLED | OUTPATIENT
Start: 2018-06-25

## 2018-06-25 RX ORDER — HALOPERIDOL 5 MG
5 TABLET ORAL EVERY 6 HOURS PRN
Status: CANCELLED | OUTPATIENT
Start: 2018-06-25

## 2018-06-25 RX ORDER — RISPERIDONE 1 MG/1
1 TABLET, ORALLY DISINTEGRATING ORAL
Status: DISCONTINUED | OUTPATIENT
Start: 2018-06-25 | End: 2018-06-29 | Stop reason: HOSPADM

## 2018-06-25 RX ADMIN — METHADONE HYDROCHLORIDE 137 MG: 10 CONCENTRATE ORAL at 10:40

## 2018-06-25 RX ADMIN — NICOTINE 21 MG: 21 PATCH, EXTENDED RELEASE TRANSDERMAL at 11:57

## 2018-06-25 RX ADMIN — METHYLPHENIDATE HYDROCHLORIDE 40 MG: 10 TABLET ORAL at 11:44

## 2018-06-25 NOTE — ED NOTES
JERRY Alvarez Males giving pt medication  Pt calm and cooperative        Jenae [place]  06/25/18 4693

## 2018-06-25 NOTE — ED NOTES
Pt appears to be resting at this time  No distress noted  Will continue to monitor          [facility]  06/25/18 7268

## 2018-06-25 NOTE — ED NOTES
Patient is resting on the bed  Patient is showing no signs of distress  Will continue to monitor       Kelsi Montiel  06/25/18 2018

## 2018-06-25 NOTE — ED NOTES
Pt is laying in bed with no signs of distress  Will continue to monitor        Yas Squires  06/25/18 3573

## 2018-06-25 NOTE — ED NOTES
Pt is currently eating his breakfast  Asked again about his methadone stating he's "starting to feel like shit"  Charge nurse celestino Montero  06/25/18 2273

## 2018-06-25 NOTE — ED NOTES
Received report from ED Tech FS  Patient ambulated to bathroom and returned to room  Patient does not appear to be in any distress at this time  Will continue to monitor       Teresa Fairchild  06/25/18 8064

## 2018-06-25 NOTE — ED NOTES
Pt is back from the bathroom laying in bed  Pt is calm and cooperative        Jaleesa Herbert  06/25/18 1156

## 2018-06-25 NOTE — ED NOTES
Pt's parents are here to see pt  Visitors belongings are stored and locked in visitors lockers        Gaurav Valladares  06/25/18 4498

## 2018-06-25 NOTE — ED NOTES
Patient used the call bell, wants to make sure that we know today is the day he normally goes to the clinic for his methadone  I advised him that I will notify his nurse       Fam Pace  06/25/18 6759

## 2018-06-25 NOTE — ED NOTES
Pt is laying in bed with no signs of distress  Will continue to monitor        Chelly Romberg  06/25/18 3673

## 2018-06-25 NOTE — ED NOTES
Pt appears to be resting  In no apparent distress  Will continue to monitor       [facility]  06/24/18 2978

## 2018-06-25 NOTE — ED NOTES
Pt ambulated to bathroom and then back to assigned room  Pt lying in bed under covers  In no apparent distress  Will continue to monitor       Mamie Mehrdad  06/24/18 2934

## 2018-06-25 NOTE — ED NOTES
Pt in bed, under blanket with lights off  In no apparent distress  Will continue to monitor       Nhi Asia  06/24/18 5534

## 2018-06-25 NOTE — ED NOTES
Pt is currently laying in bed watching tv  No signs of distress  Will continue to monitor        Radha Lopez  06/25/18 4538

## 2018-06-25 NOTE — ED NOTES
Pt indicated that we are allowed to provide parents with information regarding his tx and transfer      Chloe Whitehead, COLIN  06/25/18  3912

## 2018-06-25 NOTE — ED NOTES
Patient appears to be resting  Patient is showing no signs of distress  Will continue to monitor       [facility]  06/25/18 2943

## 2018-06-25 NOTE — ED NOTES
Pt using phone to call his mom        Refugio [facility]  06/25/18 5022

## 2018-06-25 NOTE — ED NOTES
Call placed to [facility], spoke with Aj, who requested clinical  Clinical sent      Leah Williamson LMSW  06/25/18  7830

## 2018-06-25 NOTE — ED NOTES
201 faxed to [address] [facility] for review at [facility]       Lindsey Leggett, COLIN  06/25/18  3940

## 2018-06-25 NOTE — ED NOTES
Patient is accepted at [facility]  Patient is accepted by Dr Erica Anna per Benjamin Mcghee  Transportation is [facility] with [facility]  Transportation is scheduled for 15:00  Patient may go to the floor at 16:00  Nurse report is to be called to [phone] prior to patient transfer

## 2018-06-25 NOTE — ED NOTES
Pt denied from [facility], due to being on Methadone       Emily Mackey, [facility]  06/25/18  7242

## 2018-06-25 NOTE — ED NOTES
Patient is resting on the bed  Patient is showing no signs of distress  Will continue to monitor       Niels Moss  06/25/18 0513

## 2018-06-25 NOTE — ED NOTES
Pt appears to be resting  In no apparent distress  Will continue to monitor       Nito Genevieve  06/24/18 0769

## 2018-06-25 NOTE — ED NOTES
Pt's mother called and asked if pt is still here  Informed that pt is still here  Pt's mother mentioned that herself and his father are coming to visit pt this morning  Asked pt if that was okay with him and he approved  Pt then requested his methadone  Will asked charge nurse        Daja Hines  06/25/18 0064

## 2018-06-25 NOTE — ED NOTES
Pt appears to be resting at this time  No distress noted  Will continue to monitor         Chito Soriano  06/25/18 5916

## 2018-06-25 NOTE — ED NOTES
Call placed to [facility], and spoke with Dago, who requested clinical be faxed  Clinical sent      Kylee Valdovinos LMSW  06/25/18  9665

## 2018-06-26 PROBLEM — F25.0 SCHIZOAFFECTIVE DISORDER, BIPOLAR TYPE (HCC): Status: ACTIVE | Noted: 2018-06-26

## 2018-06-26 PROBLEM — F43.10 PTSD (POST-TRAUMATIC STRESS DISORDER): Status: ACTIVE | Noted: 2018-06-26

## 2018-06-26 PROBLEM — F14.10 COCAINE ABUSE (HCC): Status: ACTIVE | Noted: 2018-06-26

## 2018-06-26 LAB
ALBUMIN SERPL BCP-MCNC: 3.7 G/DL (ref 3.5–5)
ALP SERPL-CCNC: 96 U/L (ref 46–116)
ALT SERPL W P-5'-P-CCNC: 26 U/L (ref 12–78)
ANION GAP SERPL CALCULATED.3IONS-SCNC: 7 MMOL/L (ref 4–13)
AST SERPL W P-5'-P-CCNC: 22 U/L (ref 5–45)
BASOPHILS # BLD AUTO: 0.11 THOUSANDS/ΜL (ref 0–0.1)
BASOPHILS NFR BLD AUTO: 1 % (ref 0–1)
BILIRUB SERPL-MCNC: 0.4 MG/DL (ref 0.2–1)
BUN SERPL-MCNC: 13 MG/DL (ref 5–25)
CALCIUM SERPL-MCNC: 8.9 MG/DL (ref 8.3–10.1)
CHLORIDE SERPL-SCNC: 105 MMOL/L (ref 100–108)
CHOLEST SERPL-MCNC: 184 MG/DL (ref 50–200)
CO2 SERPL-SCNC: 31 MMOL/L (ref 21–32)
CREAT SERPL-MCNC: 0.99 MG/DL (ref 0.6–1.3)
EOSINOPHIL # BLD AUTO: 0.5 THOUSAND/ΜL (ref 0–0.61)
EOSINOPHIL NFR BLD AUTO: 5 % (ref 0–6)
ERYTHROCYTE [DISTWIDTH] IN BLOOD BY AUTOMATED COUNT: 12.9 % (ref 11.6–15.1)
EST. AVERAGE GLUCOSE BLD GHB EST-MCNC: 105 MG/DL
GFR SERPL CREATININE-BSD FRML MDRD: 99 ML/MIN/1.73SQ M
GLUCOSE P FAST SERPL-MCNC: 92 MG/DL (ref 65–99)
GLUCOSE SERPL-MCNC: 92 MG/DL (ref 65–140)
HBA1C MFR BLD: 5.3 % (ref 4.2–6.3)
HCT VFR BLD AUTO: 43.5 % (ref 36.5–49.3)
HDLC SERPL-MCNC: 60 MG/DL (ref 40–60)
HGB BLD-MCNC: 13.8 G/DL (ref 12–17)
IMM GRANULOCYTES # BLD AUTO: 0.07 THOUSAND/UL (ref 0–0.2)
IMM GRANULOCYTES NFR BLD AUTO: 1 % (ref 0–2)
LDLC SERPL CALC-MCNC: 107 MG/DL (ref 0–100)
LYMPHOCYTES # BLD AUTO: 3.33 THOUSANDS/ΜL (ref 0.6–4.47)
LYMPHOCYTES NFR BLD AUTO: 32 % (ref 14–44)
MCH RBC QN AUTO: 28.5 PG (ref 26.8–34.3)
MCHC RBC AUTO-ENTMCNC: 31.7 G/DL (ref 31.4–37.4)
MCV RBC AUTO: 90 FL (ref 82–98)
MONOCYTES # BLD AUTO: 0.89 THOUSAND/ΜL (ref 0.17–1.22)
MONOCYTES NFR BLD AUTO: 9 % (ref 4–12)
NEUTROPHILS # BLD AUTO: 5.54 THOUSANDS/ΜL (ref 1.85–7.62)
NEUTS SEG NFR BLD AUTO: 52 % (ref 43–75)
NONHDLC SERPL-MCNC: 124 MG/DL
NRBC BLD AUTO-RTO: 0 /100 WBCS
PLATELET # BLD AUTO: 355 THOUSANDS/UL (ref 149–390)
PMV BLD AUTO: 9.6 FL (ref 8.9–12.7)
POTASSIUM SERPL-SCNC: 4.6 MMOL/L (ref 3.5–5.3)
PROT SERPL-MCNC: 7.9 G/DL (ref 6.4–8.2)
RBC # BLD AUTO: 4.85 MILLION/UL (ref 3.88–5.62)
SODIUM SERPL-SCNC: 143 MMOL/L (ref 136–145)
TRIGL SERPL-MCNC: 86 MG/DL
TSH SERPL DL<=0.05 MIU/L-ACNC: 1.11 UIU/ML (ref 0.36–3.74)
WBC # BLD AUTO: 10.44 THOUSAND/UL (ref 4.31–10.16)

## 2018-06-26 PROCEDURE — 99223 1ST HOSP IP/OBS HIGH 75: CPT | Performed by: PSYCHIATRY & NEUROLOGY

## 2018-06-26 PROCEDURE — 84443 ASSAY THYROID STIM HORMONE: CPT | Performed by: PHYSICIAN ASSISTANT

## 2018-06-26 PROCEDURE — 80061 LIPID PANEL: CPT | Performed by: PHYSICIAN ASSISTANT

## 2018-06-26 PROCEDURE — 99222 1ST HOSP IP/OBS MODERATE 55: CPT | Performed by: PHYSICIAN ASSISTANT

## 2018-06-26 PROCEDURE — 85025 COMPLETE CBC W/AUTO DIFF WBC: CPT | Performed by: PHYSICIAN ASSISTANT

## 2018-06-26 PROCEDURE — 80053 COMPREHEN METABOLIC PANEL: CPT | Performed by: PHYSICIAN ASSISTANT

## 2018-06-26 PROCEDURE — 83036 HEMOGLOBIN GLYCOSYLATED A1C: CPT | Performed by: PHYSICIAN ASSISTANT

## 2018-06-26 RX ORDER — LITHIUM CARBONATE 300 MG/1
600 CAPSULE ORAL
Status: DISCONTINUED | OUTPATIENT
Start: 2018-06-26 | End: 2018-06-29 | Stop reason: HOSPADM

## 2018-06-26 RX ORDER — METHADONE HYDROCHLORIDE 10 MG/ML
137 CONCENTRATE ORAL DAILY
Status: DISCONTINUED | OUTPATIENT
Start: 2018-06-26 | End: 2018-06-26

## 2018-06-26 RX ORDER — PRAZOSIN HYDROCHLORIDE 1 MG/1
1 CAPSULE ORAL
Status: DISCONTINUED | OUTPATIENT
Start: 2018-06-26 | End: 2018-06-29 | Stop reason: HOSPADM

## 2018-06-26 RX ORDER — METHADONE HYDROCHLORIDE 40 MG/1
137 TABLET ORAL
Status: DISCONTINUED | OUTPATIENT
Start: 2018-06-26 | End: 2018-06-26

## 2018-06-26 RX ORDER — METHADONE HYDROCHLORIDE 5 MG/1
5 TABLET ORAL DAILY
Status: DISCONTINUED | OUTPATIENT
Start: 2018-06-26 | End: 2018-06-29 | Stop reason: HOSPADM

## 2018-06-26 RX ORDER — METHADONE HYDROCHLORIDE 10 MG/1
130 TABLET ORAL DAILY
Status: DISCONTINUED | OUTPATIENT
Start: 2018-06-26 | End: 2018-06-29 | Stop reason: HOSPADM

## 2018-06-26 RX ORDER — RISPERIDONE 1 MG/1
1 TABLET, ORALLY DISINTEGRATING ORAL 2 TIMES DAILY
Status: DISCONTINUED | OUTPATIENT
Start: 2018-06-26 | End: 2018-06-27

## 2018-06-26 RX ORDER — NICOTINE 21 MG/24HR
1 PATCH, TRANSDERMAL 24 HOURS TRANSDERMAL DAILY
Status: DISCONTINUED | OUTPATIENT
Start: 2018-06-26 | End: 2018-06-29 | Stop reason: HOSPADM

## 2018-06-26 RX ADMIN — RISPERIDONE 1 MG: 1 TABLET, ORALLY DISINTEGRATING ORAL at 17:09

## 2018-06-26 RX ADMIN — METHADONE HYDROCHLORIDE 5 MG: 5 TABLET ORAL at 12:07

## 2018-06-26 RX ADMIN — RISPERIDONE 1 MG: 1 TABLET, ORALLY DISINTEGRATING ORAL at 12:04

## 2018-06-26 RX ADMIN — NICOTINE 1 PATCH: 14 PATCH TRANSDERMAL at 17:14

## 2018-06-26 RX ADMIN — METHADONE HYDROCHLORIDE 130 MG: 10 TABLET ORAL at 12:06

## 2018-06-26 RX ADMIN — PRAZOSIN HYDROCHLORIDE 1 MG: 1 CAPSULE ORAL at 22:02

## 2018-06-26 RX ADMIN — LITHIUM CARBONATE 600 MG: 300 CAPSULE, GELATIN COATED ORAL at 17:09

## 2018-06-26 NOTE — TREATMENT PLAN
TREATMENT PLAN REVIEW - [address] 33 y o  1985 male MRN: [id number]    [address] [place] [address] Room / Bed: [facility] Encounter: [id number]          Admit Date/Time:  6/25/2018  4:41 PM    Treatment Team: Attending Provider: Samantha Anaya; Consulting Physician: Samantha Anaya; Patient Care Assistant: Candi Pappas; Registered Nurse: Cristian Shaikh, RN; Registered Nurse: Bandar Costello RN; Patient Care Technician: Ricardo Patel; Care Coordinator: Aries Terrell; Occupational Therapy Assistant: Babar Ross, [address];  Medications RN: Latoya Longoria RN    Diagnosis: Principal Problem:    Schizoaffective disorder, bipolar type ([facility] )  Active Problems:    Methadone dependence ([facility] )    PTSD (post-traumatic stress disorder)    Cocaine abuse    Patient Strengths/Assets: cooperative, good past treatment response, motivation for treatment/growth, patient is on a voluntary commitment    Patient Barriers/Limitations: lack of financial means, limited motivation, low self esteem, substance abuse    Short Term Goals: decrease in depressive symptoms, decrease in anxiety symptoms, decrease in paranoid thoughts, decrease in psychotic symptoms, decrease in suicidal thoughts    Long Term Goals: improvement in depression, improvement in anxiety, resolution of manic symptoms, stabilization of mood, free of suicidal thoughts, resolution of psychotic symptoms, acceptance of need for psychiatric medications, acceptance of need for psychiatric treatment, acceptance of need for psychiatric follow up after discharge    Progress Towards Goals: starting psychiatric medications as prescribed    Recommended Treatment: medication management, patient medication education, group therapy, milieu therapy, continued Behavioral Health psychiatric evaluation/assessment process    Treatment Frequency: daily medication monitoring, group and milieu therapy daily, monitoring through interdisciplinary rounds, monitoring through weekly patient care conferences    Expected Discharge Date: 7-10 days    Discharge Plan: referral for outpatient medication management with a psychiatrist, referral for outpatient psychotherapy    Treatment Plan Created/Updated By: Samantha Anaya

## 2018-06-26 NOTE — PROGRESS NOTES
Present in milieu, attending groups and social with peers  No paranoia noted  Confirms AH "whispering voices" no signs of distress  Does not appear to be preoccupied  Denies SI/HI  Confirms depression and anxiety  Discussed rules/regulations of unit  Reminded of need for UA, specimen cup at bedside

## 2018-06-26 NOTE — CONSULTS
Consult Note   Assessment:    Depression with suicide ideation/suicide attempt  Anxiety  Bipolar disorder  Substance abuse-UDS positive for benzo, cocaine, methadone  Decreased appetite-weight loss about 10 lb      Plan:  Admitted in the hospital for psych evaluation treatment  Continue all medications  Few labs are pending   ______________________________________________________________________    Consulting Service: Psychiatry    Chief Complaint:  Suicide ideation, suicide attempt 2 weeks ago- OD on Xanax and cocaine 3-4 days ago    HPI: Katie Gonsales, a 28 y o  male admitted in the hospital for suicide ideation  He has plan to hang himself and he has a history of attempted this multiple times  History of alcohol and drug abuse but now he is in a methadone program   The recent OD on Xanax and cocaine in order to hurt himself  History of ongoing depression, substance abuse  Patient denies any homicide ideation, but admits to auditory or visual hallucinations  He is complaining of sleep disturbance any loss of appetite-loss about 10 lb recently  Please refer to psych evaluation for more information  Patient mentions that he is not taking any medications since it does not help   He mentions that he was on lithium and that helped in the past but somehow his medication got changed, new medications do not help  Patient denies any medical issues at present including chest pain, difficulty breathing, chest pain, fever, chills, nausea, vomiting, or abdominal pain   He states he is feeling somewhat better today since he got good night rest       Review of Systems:  General: negative  Cardiovascular: no chest pain or dyspnea on exertion  Respiratory: no cough, shortness of breath, or wheezing  Gastrointestinal: no abdominal pain, change in bowel habits, or black or bloody stools  Genitourinary ROS: no dysuria, trouble voiding, or hematuria  Musculoskeletal ROS: negative  Neurological ROS: no TIA or stroke symptoms  Hematological and Lymphatic ROS: negative  Dermatological ROS: negative  Psychological ROS: positive for - anxiety, depression and suicidal ideation  Ophthalmic ROS: negative  ENT ROS: negative    Past Medical History:  Past Medical History:   Diagnosis Date    Alcoholism ([place] [address] )     Anxiety     Bipolar 1 disorder (HCC)     Depression     Excessive daytime sleepiness     Hallucination     Idiopathic hypersomnia     Psychiatric disorder     Psychiatric illness     Schizophrenia ([place] [address] )     Substance abuse     Suicide attempt ([place] [address] )     hx of attempts by hanging and OD in past 3-4 months    Tobacco use        Past Surgical History:  Past Surgical History:   Procedure Laterality Date    INCISION AND DRAINAGE OF WOUND Right 4/26/2018    Procedure: INCISION AND DRAINAGE (I&D) EXTREMITY, washout and packing;  Surgeon: Cholo Nielsen MD;  Location: [facility];  Service: General    SHOULDER SURGERY Right     titanium plate       Social History:  History   Alcohol Use No     Comment: drinks 1-2 drinks yearly     History   Drug Use    Types: Cocaine, Benzodiazepines     Comment: OD on xanax and cocaine 2 days ago, cocaine use a couple times a month     History   Smoking Status    Current Every Day Smoker    Packs/day: 2 00    Years: 22 00    Types: Cigarettes   Smokeless Tobacco    Never Used       Family History:  Family History   Problem Relation Age of Onset    No Known Problems Mother     No Known Problems Father     No Known Problems Sister     No Known Problems Brother     No Known Problems Maternal Aunt     No Known Problems Paternal Aunt     No Known Problems Maternal Uncle     No Known Problems Paternal Uncle     No Known Problems Maternal Grandfather     No Known Problems Maternal Grandmother     No Known Problems Paternal Grandfather     No Known Problems Paternal Grandmother     No Known Problems Cousin     ADD / ADHD Neg Hx     Alcohol abuse Neg Hx     Anxiety disorder Neg Hx     Bipolar disorder Neg Hx     Completed Suicide  Neg Hx     Dementia Neg Hx     Depression Neg Hx     Drug abuse Neg Hx     OCD Neg Hx     Psychiatric Illness Neg Hx     Psychosis Neg Hx     Schizoaffective Disorder  Neg Hx     Schizophrenia Neg Hx     Self-Injury Neg Hx     Suicide Attempts Neg Hx        Allergies:   Allergies   Allergen Reactions    Quetiapine Other (See Comments)     parodoxical reaction       Medications:  Current Facility-Administered Medications   Medication Dose Route Frequency    acetaminophen (TYLENOL) tablet 650 mg  650 mg Oral Q6H PRN    aluminum-magnesium hydroxide-simethicone (MYLANTA) 200-200-20 mg/5 mL oral suspension 30 mL  30 mL Oral Q4H PRN    benztropine (COGENTIN) injection 1 mg  1 mg Intramuscular Q6H PRN    benztropine (COGENTIN) tablet 1 mg  1 mg Oral Q6H PRN    haloperidol (HALDOL) tablet 5 mg  5 mg Oral Q6H PRN    haloperidol lactate (HALDOL) injection 5 mg  5 mg Intramuscular Q6H PRN    hydrOXYzine HCL (ATARAX) tablet 50 mg  50 mg Oral Q6H PRN    LORazepam (ATIVAN) 2 mg/mL injection 2 mg  2 mg Intramuscular Q6H PRN    magnesium hydroxide (MILK OF MAGNESIA) 400 mg/5 mL oral suspension 30 mL  30 mL Oral Daily PRN    methadone (METHADOSE) disintegrating tablet 120 mg  120 mg Oral Early Morning    nicotine (NICODERM CQ) 21 mg/24 hr TD 24 hr patch 21 mg  21 mg Transdermal Once    nicotine polacrilex (NICORETTE) gum 2 mg  2 mg Oral Q2H PRN    OLANZapine (ZyPREXA) IM injection 10 mg  10 mg Intramuscular Q8H PRN    OLANZapine (ZyPREXA) tablet 10 mg  10 mg Oral Q8H PRN    risperiDONE (RisperDAL M-TABS) dispersible tablet 1 mg  1 mg Oral Q3H PRN    traZODone (DESYREL) tablet 50 mg  50 mg Oral HS PRN       Vitals:    06/25/18 1709 06/25/18 2010 06/26/18 0759   BP: 131/83 104/78 133/64   BP Location: Left arm Right arm Right arm   Pulse: 75 69 (!) 49   Resp: 19 20 16   Temp: 98 1 °F (36 7 °C) (!) 96 8 °F (36 °C) (!) 96 9 °F (36 1 °C)   TempSrc: Tympanic Tympanic Tympanic   Weight: 62 5 kg (137 lb 11 2 oz)     Height: 5' 11" (1 803 m)           I/Os:  No intake/output data recorded  No intake/output data recorded  Lab Results and Cultures:   CBC with diff:   Lab Results   Component Value Date    WBC 10 44 (H) 06/26/2018    HGB 13 8 06/26/2018    HCT 43 5 06/26/2018    MCV 90 06/26/2018     06/26/2018    MCH 28 5 06/26/2018    MCHC 31 7 06/26/2018    RDW 12 9 06/26/2018    MPV 9 6 06/26/2018    NRBC 0 06/26/2018   ,   BMP/CMP:  Lab Results   Component Value Date     06/26/2018    K 4 6 06/26/2018     06/26/2018    CO2 31 06/26/2018    ANIONGAP 7 06/26/2018    BUN 13 06/26/2018    CREATININE 0 99 06/26/2018    GLUCOSE 92 06/26/2018    GLUCOSE 103 04/26/2018    CALCIUM 8 9 06/26/2018    AST 22 06/26/2018    ALT 26 06/26/2018    ALKPHOS 96 06/26/2018    PROT 7 9 06/26/2018    BILITOT 0 40 06/26/2018    EGFR 99 06/26/2018    EGFR 124 04/26/2018   ,   Lipid Panel:   Lab Results   Component Value Date    CHOL 184 06/26/2018   ,   Coags:   Lab Results   Component Value Date    PTT 32 04/26/2018    INR 0 96 04/26/2018   ,     Blood Culture:   Lab Results   Component Value Date    BLOODCX No Growth After 5 Days  04/26/2018    BLOODCX No Growth After 5 Days  04/26/2018   ,   Urinalysis: No results found for: Marthann End, SPECGRAV, PHUR, LEUKOCYTESUR, NITRITE, PROTEINUA, GLUCOSEU, KETONESU, BILIRUBINUR, BLOODU,   Urine Culture: No results found for: URINECX,   Wound Culure: No results found for: WOUNDCULT      Physical Exam:    General appearance: alert and oriented, in no acute distress  Head: Normocephalic, without obvious abnormality, atraumatic  Eyes: conjunctivae/corneas clear  PERRL, EOM's intact  Fundi benign    Throat: lips, mucosa, and tongue normal; teeth and gums normal  Neck: no adenopathy, no carotid bruit, no JVD, supple, symmetrical, trachea midline and thyroid not enlarged, symmetric, no tenderness/mass/nodules  Back: symmetric, no curvature  ROM normal  No CVA tenderness  Lungs: clear to auscultation bilaterally  Chest wall: no tenderness  Heart: regular rate and rhythm, S1, S2 normal, no murmur, click, rub or gallop  Abdomen: soft, non-tender; bowel sounds normal; no masses,  no organomegaly  Genitalia: deferred  Rectal: deferred  Extremities: extremities normal, warm and well-perfused; no cyanosis, clubbing, or edema  Skin: Skin color, texture, turgor normal  No rashes or lesions  Neurologic: Alert and oriented X 3, normal strength and tone  Normal symmetric reflexes   Normal coordination and gait      Sarah Elise PA-C  6/26/2018

## 2018-06-26 NOTE — PLAN OF CARE
Problem: SELF HARM/SUICIDALITY  Goal: Will have no self-injury during hospital stay  INTERVENTIONS:  - Q 15 MINUTES: Routine safety checks  - Q WAKING SHIFT & PRN: Assess risk to determine if routine checks are adequate to maintain patient safety  - Encourage patient to participate actively in care by formulating a plan to combat response to suicidal ideation, identify supports and resources   Outcome: Progressing      Problem: DEPRESSION  Goal: Will be euthymic at discharge  INTERVENTIONS:  - Administer medication as ordered  - Provide emotional support via 1:1 interaction with staff  - Encourage involvement in milieu/groups/activities  - Monitor for social isolation   Outcome: Progressing      Problem: PSYCHOSIS  Goal: Will report no hallucinations or delusions  Interventions:  - Administer medication as  ordered  - Every waking shifts and PRN assess for the presence of hallucinations and or delusions  - Assist with reality testing to support increasing orientation  - Assess if patient's hallucinations or delusions are encouraging self-harm or harm to others and intervene as appropriate   Outcome: Progressing      Problem: SUBSTANCE USE/ABUSE  Goal: Will have no detox symptoms and will verbalize plan for changing substance-related behavior  INTERVENTIONS:  - Monitor physical status and assess for symptoms of withdrawal  - Administer medication as ordered  - Provide emotional support with 1 on 1 interaction with staff  - Encourage recovery focused program/ addiction education  - Assess for verbalization of changing behaviors related to substance abuse  - Initiate consults and referrals as appropriate (Case Management, Spiritual Care, etc )   Outcome: Progressing    Goal: By discharge, will develop insight into their chemical dependency and sustain motivation to continue in recovery  INTERVENTIONS:  - Attends all daily group sessions and scheduled AA groups  - Actively practices coping skills through participation in the therapeutic community and adherence to program rules  - Reviews and completes assignments from individual treatment plan  - Assist patient development of understanding of their personal cycle of addiction and relapse triggers   Outcome: Progressing    Goal: By discharge, patient will have ongoing treatment plan addressing chemical dependency  INTERVENTIONS:  - Assist patient with resources and/or appointments for ongoing recovery based living   Outcome: Progressing      Problem: SLEEP DISTURBANCE  Goal: Will exhibit normal sleeping pattern  Interventions:  -  Assess the patients sleep pattern, noting recent changes  - Administer medication as ordered  - Decrease environmental stimuli, including noise, as appropriate during the night  - Encourage the patient to actively participate in unit groups and or exercise during the day to enhance ability to achieve adequate sleep at night  - Assess the patient, in the morning, encouraging a description of sleep experience   Outcome: Progressing      Problem: Ineffective Coping  Goal: Participates in unit activities  Interventions:  - Provide therapeutic environment   - Provide required programming   - Redirect inappropriate behaviors    Outcome: Progressing      Problem: DISCHARGE PLANNING  Goal: Discharge to home or other facility with appropriate resources  INTERVENTIONS:  - Identify barriers to discharge w/patient and caregiver  - Arrange for needed discharge resources and transportation as appropriate  - Identify discharge learning needs (meds, wound care, etc )  - Arrange for interpretive services to assist at discharge as needed  - Refer to Case Management Department for coordinating discharge planning if the patient needs post-hospital services based on physician/advanced practitioner order or complex needs related to functional status, cognitive ability, or social support system   Outcome: Progressing

## 2018-06-26 NOTE — TREATMENT PLAN
RN will meet with pt at least twice daily to assess any concerns and provide eduction on prescribed medications, diagnoses and coping skills
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
## 2018-06-26 NOTE — PROGRESS NOTES
Pt is a 89 admitted from [facility] ED for SI, increased depression, paranoid delusions and command AH to kill himself  Pt reports he has been in a "spiral of depression" for the past 3-4 months in which he feels useless, down, and "mopey," pt reports constant AH telling him that he's worthless and to kill himself, pt also has thoughts that helicopters are following him but is able to believe his wife when she says that she does not hear helicopters  Pt reports attempts to hang himself in the past few months and reports overdosing on cocaine and Xanax a couple days ago, is unsure of the amount and has difficulty remembering the OD  Pt reports hx of heroin addiction from ages [age], denies heroin use since starting methadone 3-4 years ago  Pt also reports hx of alcoholism but reports he has only been a social drinker since starting methadone, drinks a beer or two yearly  Pt reports smoking or injecting cocaine a couple of times per month, states he only started using cocaine 3-4 months ago  Pt reports he only used Xanax to OD and that it makes him black out  Smokes 2 ppd  Pt reports medical history includes arthritis, a titanium plate in his shoulder, and 60% hearing loss in both ears  Pt also reports sleep disorder for which he takes Ritalin  Pt gave verbal consent to call [facility] pharmacy to confirm medications, [facility] confirms Ritalin but reports he last picked up prescription in May  [facility] reports pt had 5 day orders for Cogentin, Clonidine, Zyprexa, Lexapro, and Trileptal from his psychiatrist on [address] but that pt never picked medications up, pt reports he does not like how the medications make him feel  Reports depression, anxiety, and command AH to kill himself on admission but denies SI presently and verbally contracts for safety, denies HI  Pleasant and cooperative, out in milieu for most of shift  Pt reports hx of MRSA in incision on R arm, no open wounds presently

## 2018-06-26 NOTE — PROGRESS NOTES
[address] [place] [address] ([phone]) to verify pt's methadone dosage  Received verbal verification that pt was last dosed 6/24/2018 with 137 mg of methadone  Requested written verification of recent dosing  KATHY faxed to [facility] ([phone])  Awaiting written verification  Written verification of methadone dosing received and placed in patient's chart

## 2018-06-27 LAB
BILIRUB UR QL STRIP: NEGATIVE
CLARITY UR: CLEAR
COLOR UR: YELLOW
GLUCOSE UR STRIP-MCNC: NEGATIVE MG/DL
HGB UR QL STRIP.AUTO: NEGATIVE
KETONES UR STRIP-MCNC: NEGATIVE MG/DL
LEUKOCYTE ESTERASE UR QL STRIP: NEGATIVE
NITRITE UR QL STRIP: NEGATIVE
PH UR STRIP.AUTO: 6 [PH] (ref 4.5–8)
PROT UR STRIP-MCNC: NEGATIVE MG/DL
SP GR UR STRIP.AUTO: 1.02 (ref 1–1.03)
UROBILINOGEN UR QL STRIP.AUTO: 0.2 E.U./DL

## 2018-06-27 PROCEDURE — 81003 URINALYSIS AUTO W/O SCOPE: CPT | Performed by: PHYSICIAN ASSISTANT

## 2018-06-27 PROCEDURE — 99232 SBSQ HOSP IP/OBS MODERATE 35: CPT | Performed by: PSYCHIATRY & NEUROLOGY

## 2018-06-27 RX ORDER — RISPERIDONE 1 MG/1
1 TABLET, ORALLY DISINTEGRATING ORAL DAILY
Status: DISCONTINUED | OUTPATIENT
Start: 2018-06-28 | End: 2018-06-29 | Stop reason: HOSPADM

## 2018-06-27 RX ORDER — RISPERIDONE 2 MG/1
2 TABLET, ORALLY DISINTEGRATING ORAL
Status: DISCONTINUED | OUTPATIENT
Start: 2018-06-27 | End: 2018-06-29 | Stop reason: HOSPADM

## 2018-06-27 RX ADMIN — HYDROXYZINE HYDROCHLORIDE 50 MG: 50 TABLET, FILM COATED ORAL at 17:38

## 2018-06-27 RX ADMIN — METHADONE HYDROCHLORIDE 5 MG: 5 TABLET ORAL at 08:54

## 2018-06-27 RX ADMIN — LITHIUM CARBONATE 600 MG: 300 CAPSULE, GELATIN COATED ORAL at 17:37

## 2018-06-27 RX ADMIN — RISPERIDONE 1 MG: 1 TABLET, ORALLY DISINTEGRATING ORAL at 08:53

## 2018-06-27 RX ADMIN — METHADONE HYDROCHLORIDE 130 MG: 10 TABLET ORAL at 08:53

## 2018-06-27 RX ADMIN — RISPERIDONE 2 MG: 2 TABLET, ORALLY DISINTEGRATING ORAL at 17:37

## 2018-06-27 RX ADMIN — NICOTINE 1 PATCH: 14 PATCH TRANSDERMAL at 08:53

## 2018-06-27 RX ADMIN — PRAZOSIN HYDROCHLORIDE 1 MG: 1 CAPSULE ORAL at 21:39

## 2018-06-27 NOTE — PROGRESS NOTES
Polite and cooperative  Restless during conversation, moving from left to right foot and reports he has dx  of ADHD  Denies SI/HI  Chronic AH of voices and says they never fully go away no matter the medication  No paranoia noted  UA obtained  Compliant with medications, denies side effects

## 2018-06-27 NOTE — PROGRESS NOTES
Progress Note - Behavioral Health   Sam Montez 28 y o  male MRN: [id number]  Unit/Bed#: [facility] 258-02 Encounter: [id number]    Assessment/Plan   Principal Problem:    Schizoaffective disorder, bipolar type ([facility] )  Active Problems:    Methadone dependence ([facility] )    PTSD (post-traumatic stress disorder)    Cocaine abuse      Subjective:  Patient is compliant with medications with no acute side effects  Patient reports slow improvement in mood and anxiety with slow improvement in hallucinations  Patient reports mild sedation but denies any other physical complaints  Reduction in intrusive flashbacks  Patient is seen more out in milieu today  He is consenting for safety on the unit and agreed with treatment planning of increasing risperidone for auditory hallucination management today      Current Medications:    Current Facility-Administered Medications:  acetaminophen 650 mg Oral Q6H PRN Charley Suresh, PA-OMRALES   aluminum-magnesium hydroxide-simethicone 30 mL Oral Q4H PRN Charley Suresh, PA-MORALES   benztropine 1 mg Intramuscular Q6H PRN Charley Suresh, PA-MORALES   benztropine 1 mg Oral Q6H PRN Charley Suresh, PA-C   haloperidol 5 mg Oral Q6H PRN Charley Suresh, PA-MORALES   haloperidol lactate 5 mg Intramuscular Q6H PRN Charley Suresh, PA-MORALES   hydrOXYzine HCL 50 mg Oral Q6H PRN Charley Suresh, PA-MORALES   lithium carbonate 600 mg Oral After Dinner Keven Dawn   LORazepam 2 mg Intramuscular Q6H PRN Charley Suresh, PA-MORALES   magnesium hydroxide 30 mL Oral Daily PRN Charley Suresh, OLIMPIA   methadone 130 mg Oral Daily Shwetha Navarro   And       methadone 5 mg Oral Daily Charley Suresh, OLIMPIA   nicotine 1 patch Transdermal Daily Keven Dawn   nicotine polacrilex 2 mg Oral Q2H PRN Charley Suresh, PA-MORALES   OLANZapine 10 mg Intramuscular Q8H PRN Charley Suresh, PA-MORALES   OLANZapine 10 mg Oral Q8H PRN Charley Suresh, OLIMPIA   prazosin 1 mg Oral HS Keven Dawn   risperiDONE 1 mg Oral Q3H PRN Florentino Phelps Geronimo Eddy PA-C   [START ON 6/28/2018] risperiDONE 1 mg Oral Daily Keven Dawn   risperiDONE 2 mg Oral After Dinner Deborra Jeanne   traZODone 50 mg Oral HS PRN Delisa Arambula PA-C       Behavioral Health Medications: all current active meds have been reviewed  Vital signs in last 24 hours:  Temp:  [96 4 °F (35 8 °C)-96 7 °F (35 9 °C)] 96 7 °F (35 9 °C)  HR:  [48-71] 71  Resp:  [16-19] 16  BP: ()/(50-78) 108/70    Laboratory results:    I have personally reviewed all pertinent laboratory/tests results    Labs in last 72 hours:   Recent Labs      06/26/18   0828  06/26/18   0829   WBC   --   10 44*   RBC   --   4 85   HGB   --   13 8   HCT   --   43 5   PLT   --   355   RDW   --   12 9   NEUTROABS   --   5 54   NA  143   --    K  4 6   --    CL  105   --    CO2  31   --    BUN  13   --    CREATININE  0 99   --    GLUCOSE  92   --    CALCIUM  8 9   --    AST  22   --    ALT  26   --    ALKPHOS  96   --    PROT  7 9   --    BILITOT  0 40   --    CHOL  184   --    HDL  60   --    TRIG  86   --    LDLCALC  107*   --    [id number]  1 107   --      Admission Labs:   Admission on 06/25/2018   Component Date Value    Color, UA 06/27/2018 Yellow     Clarity, UA 06/27/2018 Clear     Specific Gravity, UA 06/27/2018 1 020     pH, UA 06/27/2018 6 0     Leukocytes, UA 06/27/2018 Negative     Nitrite, UA 06/27/2018 Negative     Protein, UA 06/27/2018 Negative     Glucose, UA 06/27/2018 Negative     Ketones, UA 06/27/2018 Negative     Urobilinogen, UA 06/27/2018 0 2     Bilirubin, UA 06/27/2018 Negative     Blood, UA 06/27/2018 Negative     WBC 06/26/2018 10 44*    RBC 06/26/2018 4 85     Hemoglobin 06/26/2018 13 8     Hematocrit 06/26/2018 43 5     MCV 06/26/2018 90     MCH 06/26/2018 28 5     MCHC 06/26/2018 31 7     RDW 06/26/2018 12 9     MPV 06/26/2018 9 6     Platelets 43/45/3261 355     nRBC 06/26/2018 0     Neutrophils Relative 06/26/2018 52     Immat GRANS % 06/26/2018 1     Lymphocytes Relative 06/26/2018 32     Monocytes Relative 06/26/2018 9     Eosinophils Relative 06/26/2018 5     Basophils Relative 06/26/2018 1     Neutrophils Absolute 06/26/2018 5 54     Immature Grans Absolute 06/26/2018 0 07     Lymphocytes Absolute 06/26/2018 3 33     Monocytes Absolute 06/26/2018 0 89     Eosinophils Absolute 06/26/2018 0 50     Basophils Absolute 06/26/2018 0 11*    Sodium 06/26/2018 143     Potassium 06/26/2018 4 6     Chloride 06/26/2018 105     CO2 06/26/2018 31     Anion Gap 06/26/2018 7     BUN 06/26/2018 13     Creatinine 06/26/2018 0 99     Glucose 06/26/2018 92     Glucose, Fasting 06/26/2018 92     Calcium 06/26/2018 8 9     AST 06/26/2018 22     ALT 06/26/2018 26     Alkaline Phosphatase 06/26/2018 96     Total Protein 06/26/2018 7 9     Albumin 06/26/2018 3 7     Total Bilirubin 06/26/2018 0 40     eGFR 06/26/2018 99     Cholesterol 06/26/2018 184     Triglycerides 06/26/2018 86     HDL, Direct 06/26/2018 60     LDL Calculated 06/26/2018 107*    Non-HDL-Chol (CHOL-HDL) 06/26/2018 124     Hemoglobin A1C 06/26/2018 5 3     EAG 06/26/2018 105     TSH 3RD GENERATON 06/26/2018 1  107        Psychiatric Review of Systems:  Behavior over the last 24 hours:  unchanged  Sleep: normal  Appetite: normal  Medication side effects: No  ROS: no complaints    Mental Status Evaluation:  Appearance:  casually dressed   Behavior:  guarded   Speech:  soft   Mood:  anxious and depressed   Affect:  increased in range   Language naming objects   Thought Process:  circumstantial   Thought Content:  delusions  persecutory   Perceptual Disturbances:  Auditory hallucinations without commands   Risk Potential: Suicidal Ideations without plan, Homicidal Ideations none and Potential for Aggression No   Sensorium:  person and place   Cognition:  grossly intact   Consciousness:  awake    Attention: attention span appeared shorter than expected for age   Insight:  limited Judgment: limited   Intellect fair   Gait/Station: normal gait/station   Motor Activity: no abnormal movements     Memory: Short and long term memory  fair     Progress Toward Goals: slow progress    Recommended Treatment:   Increase risperidone to 1 mg a m  -2 mg at  for psychosis and mood stabilization  Continue with group therapy, milieu therapy and occupational therapy  Continue following current medications:   Current Facility-Administered Medications:  acetaminophen 650 mg Oral Q6H PRN [facility], PA-C   aluminum-magnesium hydroxide-simethicone 30 mL Oral Q4H PRN [facility], PA-C   benztropine 1 mg Intramuscular Q6H PRN [facility], PA-C   benztropine 1 mg Oral Q6H PRN [facility], PA-C   haloperidol 5 mg Oral Q6H PRN [facility], PA-C   haloperidol lactate 5 mg Intramuscular Q6H PRN [facility], PA-C   hydrOXYzine HCL 50 mg Oral Q6H PRN [facility], PA-C   lithium carbonate 600 mg Oral After Dinner [facility]   LORazepam 2 mg Intramuscular Q6H PRN [facility], PA-C   magnesium hydroxide 30 mL Oral Daily PRN [facility], PA-C   methadone 130 mg Oral Daily [facility], PA-C   And       methadone 5 mg Oral Daily [facility], PA-C   nicotine 1 patch Transdermal Daily [facility]   nicotine polacrilex 2 mg Oral Q2H PRN [facility], PA-C   OLANZapine 10 mg Intramuscular Q8H PRN [facility], PA-C   OLANZapine 10 mg Oral Q8H PRN [facility], PA-C   prazosin 1 mg Oral HS [facility]   risperiDONE 1 mg Oral Q3H PRN [facility], PA-C   [START ON 6/28/2018] risperiDONE 1 mg Oral Daily [facility]   risperiDONE 2 mg Oral After Dinner Og Edwards   traZODone 50 mg Oral HS PRN [facility], PA-C       Risks, benefits and possible side effects of Medications:   Risks, benefits, and possible side effects of medications explained to patient and patient verbalizes understanding          This note has been constructed using a voice recognition system  There may be translation, syntax,  or grammatical errors  If you have any questions, please contact the dictating provider

## 2018-06-28 ENCOUNTER — TELEPHONE (OUTPATIENT)
Dept: PULMONOLOGY | Facility: CLINIC | Age: 33
End: 2018-06-28

## 2018-06-28 PROCEDURE — 99232 SBSQ HOSP IP/OBS MODERATE 35: CPT | Performed by: PSYCHIATRY & NEUROLOGY

## 2018-06-28 RX ADMIN — NICOTINE 1 PATCH: 14 PATCH TRANSDERMAL at 08:55

## 2018-06-28 RX ADMIN — RISPERIDONE 2 MG: 2 TABLET, ORALLY DISINTEGRATING ORAL at 17:20

## 2018-06-28 RX ADMIN — METHADONE HYDROCHLORIDE 130 MG: 10 TABLET ORAL at 08:54

## 2018-06-28 RX ADMIN — METHADONE HYDROCHLORIDE 5 MG: 5 TABLET ORAL at 08:55

## 2018-06-28 RX ADMIN — RISPERIDONE 1 MG: 1 TABLET, ORALLY DISINTEGRATING ORAL at 08:55

## 2018-06-28 RX ADMIN — LITHIUM CARBONATE 600 MG: 300 CAPSULE, GELATIN COATED ORAL at 17:20

## 2018-06-28 RX ADMIN — ACETAMINOPHEN 650 MG: 325 TABLET, FILM COATED ORAL at 14:02

## 2018-06-28 RX ADMIN — PRAZOSIN HYDROCHLORIDE 1 MG: 1 CAPSULE ORAL at 21:19

## 2018-06-28 NOTE — CASE MANAGEMENT
Pt is [facility] to go [facility] the [facility] and has requested to go to [facility] to meet his girlfriend there who can transport him back home  SW sent [place] request confirmed by Mario Turner ([phone])

## 2018-06-28 NOTE — PROGRESS NOTES
Pt received atarax  Medication effective  Pt is currently sleeping; no respiratory distress observed  Continue to monitor

## 2018-06-28 NOTE — CASE MANAGEMENT
Loy spoke with Bibi @ pt's Methadone program at [facility] ([phone] ext [phone])  Pt is able to return on Saturday 30th June between the hours of 7-10 am  Pt will need to bring a lock box as they are not open on Sunday  Pt advised by Bibi to call her to arrange a counseling session  Her direct number is [phone] Ext [phone]  Fax number is [phone]

## 2018-06-28 NOTE — DISCHARGE INSTR - OTHER ORDERS
24/[address] [facility]  [phone]    [employer] Toll Free: [phone]

## 2018-06-28 NOTE — PROGRESS NOTES
Pt states anxiety is a "4," and depression is a "5 " He denies S/H/I; states he has AH telling him to hurt himself  He denies any feelings of self harm and has contracted for safety  He is visible on the unit  He shared he has support of parents and wife outside of here  Will continue to monitor, provide support and encouragement

## 2018-06-28 NOTE — PROGRESS NOTES
Progress Note - Behavioral Health   Stephan Murcia 28 y o  male MRN: [id number]  Unit/Bed#: [facility] 258-02 Encounter: [id number]    Assessment/Plan   Principal Problem:    Schizoaffective disorder, bipolar type ([facility] 75 )  Active Problems:    Methadone dependence ([facility] )    PTSD (post-traumatic stress disorder)    Cocaine abuse      Subjective:  Patient is compliant with medications with no acute side effects  Patient reports improvement in mood and anxiety and denies endorsing suicidal or homicidal ideation  Patient remained focused on discharge and signed a 72 hour notice yesterday  Patient is agreeable with plan of continuation of admission for safety assessment and planning discharge tomorrow if patient shows no behavior of dangerousness to self or others  Patient is seen out in milieu attending groups and remained appropriate in milieu  No signs of lithium toxicity noted      Current Medications:    Current Facility-Administered Medications:  acetaminophen 650 mg Oral Q6H PRN Ari Head PA-C   aluminum-magnesium hydroxide-simethicone 30 mL Oral Q4H PRN Ari Head PA-C   benztropine 1 mg Intramuscular Q6H PRN ADELE Moraes-MORALES   benztropine 1 mg Oral Q6H PRN Ari Head PA-C   haloperidol 5 mg Oral Q6H PRN Ari Head PA-C   haloperidol lactate 5 mg Intramuscular Q6H PRN ADELE Moraes-MORALES   hydrOXYzine HCL 50 mg Oral Q6H PRN Ari Head PA-C   lithium carbonate 600 mg Oral After Dinner Keven Dawn   LORazepam 2 mg Intramuscular Q6H PRN ADELE Moraes-MORALES   magnesium hydroxide 30 mL Oral Daily PRN Ari Head PA-C   methadone 130 mg Oral Daily Shwetha Moraes   And       methadone 5 mg Oral Daily Ari Head PA-C   nicotine 1 patch Transdermal Daily Keven Dawn   nicotine polacrilex 2 mg Oral Q2H PRN ADELE Moraes-MORALES   OLANZapine 10 mg Intramuscular Q8H PRN ADELE Moraes-MORALES   OLANZapine 10 mg Oral Q8H PRN Ari Head OLIMPIA   prazosin 1 mg Oral HS Keven Dawn   risperiDONE 1 mg Oral Q3H PRN Sadi Morales PA-C   risperiDONE 1 mg Oral Daily Keven Dawn   risperiDONE 2 mg Oral After Dinner Griffin [employer]ringer   traZODone 50 mg Oral HS PRN Sadi Morlaes PA-C       Behavioral Health Medications: all current active meds have been reviewed  Vital signs in last 24 hours:  Temp:  [97 6 °F (36 4 °C)-97 7 °F (36 5 °C)] 97 6 °F (36 4 °C)  HR:  [55-58] 58  Resp:  [17-18] 18  BP: ()/(59-62) 99/62    Laboratory results:    I have personally reviewed all pertinent laboratory/tests results    Labs in last 72 hours:   Recent Labs      06/26/18   0828  06/26/18   0829   WBC   --   10 44*   RBC   --   4 85   HGB   --   13 8   HCT   --   43 5   PLT   --   355   RDW   --   12 9   NEUTROABS   --   5 54   NA  143   --    K  4 6   --    CL  105   --    CO2  31   --    BUN  13   --    CREATININE  0 99   --    GLUCOSE  92   --    CALCIUM  8 9   --    AST  22   --    ALT  26   --    ALKPHOS  96   --    PROT  7 9   --    BILITOT  0 40   --    CHOL  184   --    HDL  60   --    TRIG  86   --    LDLCALC  107*   --    [id number]  1 107   --      Admission Labs:   Admission on 06/25/2018   Component Date Value    Color, UA 06/27/2018 Yellow     Clarity, UA 06/27/2018 Clear     Specific Gravity, UA 06/27/2018 1 020     pH, UA 06/27/2018 6 0     Leukocytes, UA 06/27/2018 Negative     Nitrite, UA 06/27/2018 Negative     Protein, UA 06/27/2018 Negative     Glucose, UA 06/27/2018 Negative     Ketones, UA 06/27/2018 Negative     Urobilinogen, UA 06/27/2018 0 2     Bilirubin, UA 06/27/2018 Negative     Blood, UA 06/27/2018 Negative     WBC 06/26/2018 10 44*    RBC 06/26/2018 4 85     Hemoglobin 06/26/2018 13 8     Hematocrit 06/26/2018 43 5     MCV 06/26/2018 90     MCH 06/26/2018 28 5     MCHC 06/26/2018 31 7     RDW 06/26/2018 12 9     MPV 06/26/2018 9 6     Platelets 46/64/6350 355     nRBC 06/26/2018 0     Neutrophils Relative 06/26/2018 52     Immat GRANS % 06/26/2018 1     Lymphocytes Relative 06/26/2018 32     Monocytes Relative 06/26/2018 9     Eosinophils Relative 06/26/2018 5     Basophils Relative 06/26/2018 1     Neutrophils Absolute 06/26/2018 5 54     Immature Grans Absolute 06/26/2018 0 07     Lymphocytes Absolute 06/26/2018 3 33     Monocytes Absolute 06/26/2018 0 89     Eosinophils Absolute 06/26/2018 0 50     Basophils Absolute 06/26/2018 0 11*    Sodium 06/26/2018 143     Potassium 06/26/2018 4 6     Chloride 06/26/2018 105     CO2 06/26/2018 31     Anion Gap 06/26/2018 7     BUN 06/26/2018 13     Creatinine 06/26/2018 0 99     Glucose 06/26/2018 92     Glucose, Fasting 06/26/2018 92     Calcium 06/26/2018 8 9     AST 06/26/2018 22     ALT 06/26/2018 26     Alkaline Phosphatase 06/26/2018 96     Total Protein 06/26/2018 7 9     Albumin 06/26/2018 3 7     Total Bilirubin 06/26/2018 0 40     eGFR 06/26/2018 99     Cholesterol 06/26/2018 184     Triglycerides 06/26/2018 86     HDL, Direct 06/26/2018 60     LDL Calculated 06/26/2018 107*    Non-HDL-Chol (CHOL-HDL) 06/26/2018 124     Hemoglobin A1C 06/26/2018 5 3     EAG 06/26/2018 105     TSH 3RD GENERATON 06/26/2018 1  107        Psychiatric Review of Systems:  Behavior over the last 24 hours:  improved  Sleep: normal  Appetite: normal  Medication side effects: No  ROS: no complaints    Mental Status Evaluation:  Appearance:  casually dressed   Behavior:  guarded   Speech:  soft   Mood:  angry, anxious and depressed   Affect:  constricted   Language naming objects and repeating phrases   Thought Process:  circumstantial   Thought Content:  obsessions   Perceptual Disturbances: None   Risk Potential: Suicidal Ideations none, Homicidal Ideations none and Potential for Aggression No   Sensorium:  person and place   Cognition:  grossly intact   Consciousness:  awake    Attention: attention span appeared shorter than expected for age Insight:  limited   Judgment: limited   Intellect fair   Gait/Station: normal gait/station and normal balance   Motor Activity: no abnormal movements     Memory: Short and long term memory  fair     Progress Toward Goals: progressing    Recommended Treatment:   Continue with group therapy, milieu therapy and occupational therapy  Continue following current medications:   Current Facility-Administered Medications:  acetaminophen 650 mg Oral Q6H PRN Kasey Hopper, PA-MORALES   aluminum-magnesium hydroxide-simethicone 30 mL Oral Q4H PRN Kasey Hopper, PA-MORALES   benztropine 1 mg Intramuscular Q6H PRN Kasey Hopper, PA-C   benztropine 1 mg Oral Q6H PRN Kasey Hopper, PA-C   haloperidol 5 mg Oral Q6H PRN Kasey Hopper, PA-MORALES   haloperidol lactate 5 mg Intramuscular Q6H PRN Kasey Hopper, PA-MORALES   hydrOXYzine HCL 50 mg Oral Q6H PRN Kasey Hopper, PA-MORALES   lithium carbonate 600 mg Oral After Dinner [facility]   LORazepam 2 mg Intramuscular Q6H PRN ADELE Alonso-MORALES   magnesium hydroxide 30 mL Oral Daily PRN Kasey Hopper PA-C   methadone 130 mg Oral Daily Shwetha Alonso   And       methadone 5 mg Oral Daily Kasey Hopper PA-C   nicotine 1 patch Transdermal Daily [facility]   nicotine polacrilex 2 mg Oral Q2H PRN Kasey Hopper PA-C   OLANZapine 10 mg Intramuscular Q8H PRN Kasey Hopper, PA-MORALES   OLANZapine 10 mg Oral Q8H PRN ADELE Alonso-MORALES   prazosin 1 mg Oral HS [facility]   risperiDONE 1 mg Oral Q3H PRN ADELE Alonso-MORALES   risperiDONE 1 mg Oral Daily [facility]   risperiDONE 2 mg Oral After Dinner Chandni Rousseau   traZODone 50 mg Oral HS PRN Kasey Hopper, PA-C       Risks, benefits and possible side effects of Medications:   Risks, benefits, and possible side effects of medications explained to patient and patient verbalizes understanding  This note has been constructed using a voice recognition system      There may be translation, syntax,  or grammatical errors  If you have any questions, please contact the dictating provider

## 2018-06-29 VITALS
HEIGHT: 71 IN | DIASTOLIC BLOOD PRESSURE: 71 MMHG | WEIGHT: 137.7 LBS | RESPIRATION RATE: 18 BRPM | HEART RATE: 70 BPM | TEMPERATURE: 96.5 F | BODY MASS INDEX: 19.28 KG/M2 | SYSTOLIC BLOOD PRESSURE: 117 MMHG

## 2018-06-29 LAB
ALBUMIN SERPL BCP-MCNC: 3.6 G/DL (ref 3.5–5)
ALP SERPL-CCNC: 88 U/L (ref 46–116)
ALT SERPL W P-5'-P-CCNC: 44 U/L (ref 12–78)
ANION GAP SERPL CALCULATED.3IONS-SCNC: 6 MMOL/L (ref 4–13)
AST SERPL W P-5'-P-CCNC: 42 U/L (ref 5–45)
BILIRUB SERPL-MCNC: 0.4 MG/DL (ref 0.2–1)
BUN SERPL-MCNC: 14 MG/DL (ref 5–25)
CALCIUM SERPL-MCNC: 8.8 MG/DL (ref 8.3–10.1)
CHLORIDE SERPL-SCNC: 103 MMOL/L (ref 100–108)
CO2 SERPL-SCNC: 33 MMOL/L (ref 21–32)
CREAT SERPL-MCNC: 0.98 MG/DL (ref 0.6–1.3)
GFR SERPL CREATININE-BSD FRML MDRD: 100 ML/MIN/1.73SQ M
GLUCOSE P FAST SERPL-MCNC: 86 MG/DL (ref 65–99)
GLUCOSE SERPL-MCNC: 86 MG/DL (ref 65–140)
LITHIUM SERPL-SCNC: 0.5 MMOL/L (ref 0.5–1)
POTASSIUM SERPL-SCNC: 4.6 MMOL/L (ref 3.5–5.3)
PROT SERPL-MCNC: 7.2 G/DL (ref 6.4–8.2)
SODIUM SERPL-SCNC: 142 MMOL/L (ref 136–145)

## 2018-06-29 PROCEDURE — 80178 ASSAY OF LITHIUM: CPT | Performed by: PSYCHIATRY & NEUROLOGY

## 2018-06-29 PROCEDURE — 80053 COMPREHEN METABOLIC PANEL: CPT | Performed by: PSYCHIATRY & NEUROLOGY

## 2018-06-29 RX ORDER — LITHIUM CARBONATE 600 MG/1
600 CAPSULE ORAL
Qty: 30 CAPSULE | Refills: 1 | Status: SHIPPED | OUTPATIENT
Start: 2018-06-29 | End: 2018-07-11 | Stop reason: HOSPADM

## 2018-06-29 RX ORDER — RISPERIDONE 1 MG/1
1 TABLET, ORALLY DISINTEGRATING ORAL DAILY
Qty: 30 TABLET | Refills: 1 | Status: SHIPPED | OUTPATIENT
Start: 2018-06-29 | End: 2018-07-11 | Stop reason: HOSPADM

## 2018-06-29 RX ORDER — HYDROXYZINE 50 MG/1
50 TABLET, FILM COATED ORAL EVERY 8 HOURS PRN
Qty: 30 TABLET | Refills: 0 | Status: SHIPPED | OUTPATIENT
Start: 2018-06-29 | End: 2018-07-11 | Stop reason: HOSPADM

## 2018-06-29 RX ORDER — PRAZOSIN HYDROCHLORIDE 1 MG/1
1 CAPSULE ORAL
Qty: 30 CAPSULE | Refills: 1 | Status: ON HOLD | OUTPATIENT
Start: 2018-06-29 | End: 2018-07-11

## 2018-06-29 RX ORDER — RISPERIDONE 2 MG/1
2 TABLET, ORALLY DISINTEGRATING ORAL
Qty: 30 TABLET | Refills: 1 | Status: SHIPPED | OUTPATIENT
Start: 2018-06-29 | End: 2018-07-11 | Stop reason: HOSPADM

## 2018-06-29 RX ADMIN — NICOTINE 1 PATCH: 14 PATCH TRANSDERMAL at 08:52

## 2018-06-29 RX ADMIN — RISPERIDONE 1 MG: 1 TABLET, ORALLY DISINTEGRATING ORAL at 08:50

## 2018-06-29 RX ADMIN — METHADONE HYDROCHLORIDE 5 MG: 5 TABLET ORAL at 08:49

## 2018-06-29 RX ADMIN — METHADONE HYDROCHLORIDE 130 MG: 10 TABLET ORAL at 08:49
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
## 2018-06-29 NOTE — DISCHARGE INSTR - LAB
Contact Information: If you have any questions, concerns, pended studies, tests and/or procedures, or emergencies regarding your inpatient behavioral health visit  Please contact [place] behavioral health unit [phone] and ask to speak to a , nurse or physician  A contact is available 24 hours/ 7 days a week at this number  Summary of Procedures Performed During your Stay:  Below is a list of major procedures performed during your hospital stay and a summary of results:  - No major procedures performed  Pending Studies     None        If studies are pending at discharge, follow up with your PCP and/or referring provider

## 2018-06-29 NOTE — PROGRESS NOTES
Awake and alert  Cooperative during interaction  Frequently on telephone speaking with mom and wife who are supportive within the community  Reports decreased AH, unable to comprehend what voices are saying  Initially patient denied voices but when readdressed he stated he has chronic voices that never go away  Expressed readiness for discharge  Encouraged compliance with medications and OP appointments  No questions or concerns

## 2018-06-29 NOTE — CASE MANAGEMENT
SW spoke with pt who is verbalizing readiness for dc  Pt is denying SI and is john paul for safety when discharged  Pt will be going on the van at 10:30 and will go to [facility] ED and be met by pt's girlfriend and driven to home address which is mother and father's [address] [place], [place], [address]  Pt has outpatient psychiatric appointment scheduled for 7/5/18 @ 2:30 with Dr Naomi Koroma ([phone])  Pt does not have an active therapist at present and did not want to be referred to one at this time  Pt reports finding therapy difficult as it digs up too much of the past which pt finds uncomfortable  Pt is able to return to [facility] program [facility] and is encouraged to follow up with Leandra Finn, pt's counselor  No further needs anticipated at dc

## 2018-07-05 ENCOUNTER — HOSPITAL ENCOUNTER (EMERGENCY)
Facility: HOSPITAL | Age: 33
End: 2018-07-06
Attending: EMERGENCY MEDICINE | Admitting: EMERGENCY MEDICINE
Payer: MEDICARE

## 2018-07-05 DIAGNOSIS — R45.851 SUICIDAL IDEATION: Primary | ICD-10-CM

## 2018-07-05 LAB
AMPHETAMINES SERPL QL SCN: POSITIVE
ANION GAP SERPL CALCULATED.3IONS-SCNC: 11 MMOL/L (ref 4–13)
APAP SERPL-MCNC: <2 UG/ML (ref 10–30)
BARBITURATES UR QL: NEGATIVE
BASOPHILS # BLD AUTO: 0.09 THOUSANDS/ΜL (ref 0–0.1)
BASOPHILS NFR BLD AUTO: 1 % (ref 0–1)
BENZODIAZ UR QL: NEGATIVE
BUN SERPL-MCNC: 12 MG/DL (ref 5–25)
CALCIUM SERPL-MCNC: 8.6 MG/DL (ref 8.3–10.1)
CHLORIDE SERPL-SCNC: 104 MMOL/L (ref 100–108)
CO2 SERPL-SCNC: 25 MMOL/L (ref 21–32)
COCAINE UR QL: POSITIVE
CREAT SERPL-MCNC: 0.87 MG/DL (ref 0.6–1.3)
EOSINOPHIL # BLD AUTO: 0.4 THOUSAND/ΜL (ref 0–0.61)
EOSINOPHIL NFR BLD AUTO: 4 % (ref 0–6)
ERYTHROCYTE [DISTWIDTH] IN BLOOD BY AUTOMATED COUNT: 12.7 % (ref 11.6–15.1)
ETHANOL SERPL-MCNC: <3 MG/DL (ref 0–3)
GFR SERPL CREATININE-BSD FRML MDRD: 113 ML/MIN/1.73SQ M
GLUCOSE SERPL-MCNC: 103 MG/DL (ref 65–140)
HCT VFR BLD AUTO: 38.9 % (ref 36.5–49.3)
HGB BLD-MCNC: 12.7 G/DL (ref 12–17)
IMM GRANULOCYTES # BLD AUTO: 0.03 THOUSAND/UL (ref 0–0.2)
IMM GRANULOCYTES NFR BLD AUTO: 0 % (ref 0–2)
LYMPHOCYTES # BLD AUTO: 2.19 THOUSANDS/ΜL (ref 0.6–4.47)
LYMPHOCYTES NFR BLD AUTO: 21 % (ref 14–44)
MCH RBC QN AUTO: 29 PG (ref 26.8–34.3)
MCHC RBC AUTO-ENTMCNC: 32.6 G/DL (ref 31.4–37.4)
MCV RBC AUTO: 89 FL (ref 82–98)
METHADONE UR QL: POSITIVE
MONOCYTES # BLD AUTO: 0.9 THOUSAND/ΜL (ref 0.17–1.22)
MONOCYTES NFR BLD AUTO: 9 % (ref 4–12)
NEUTROPHILS # BLD AUTO: 6.82 THOUSANDS/ΜL (ref 1.85–7.62)
NEUTS SEG NFR BLD AUTO: 65 % (ref 43–75)
NRBC BLD AUTO-RTO: 0 /100 WBCS
OPIATES UR QL SCN: NEGATIVE
PCP UR QL: NEGATIVE
PLATELET # BLD AUTO: 186 THOUSANDS/UL (ref 149–390)
PMV BLD AUTO: 11.8 FL (ref 8.9–12.7)
POTASSIUM SERPL-SCNC: 4.3 MMOL/L (ref 3.5–5.3)
RBC # BLD AUTO: 4.38 MILLION/UL (ref 3.88–5.62)
SALICYLATES SERPL-MCNC: <3 MG/DL (ref 3–20)
SODIUM SERPL-SCNC: 140 MMOL/L (ref 136–145)
THC UR QL: POSITIVE
WBC # BLD AUTO: 10.43 THOUSAND/UL (ref 4.31–10.16)

## 2018-07-05 PROCEDURE — 80048 BASIC METABOLIC PNL TOTAL CA: CPT | Performed by: EMERGENCY MEDICINE

## 2018-07-05 PROCEDURE — 85025 COMPLETE CBC W/AUTO DIFF WBC: CPT | Performed by: EMERGENCY MEDICINE

## 2018-07-05 PROCEDURE — 80329 ANALGESICS NON-OPIOID 1 OR 2: CPT | Performed by: EMERGENCY MEDICINE

## 2018-07-05 PROCEDURE — 80320 DRUG SCREEN QUANTALCOHOLS: CPT | Performed by: EMERGENCY MEDICINE

## 2018-07-05 PROCEDURE — 93010 ELECTROCARDIOGRAM REPORT: CPT | Performed by: INTERNAL MEDICINE

## 2018-07-05 PROCEDURE — 80307 DRUG TEST PRSMV CHEM ANLYZR: CPT | Performed by: EMERGENCY MEDICINE

## 2018-07-05 PROCEDURE — 93005 ELECTROCARDIOGRAM TRACING: CPT

## 2018-07-05 PROCEDURE — 36415 COLL VENOUS BLD VENIPUNCTURE: CPT | Performed by: EMERGENCY MEDICINE

## 2018-07-05 NOTE — ED NOTES
Pt arrived to ED via EMS  Pt immediately walked into the bathroom  Toilet flushed several times before Pt returned to his room to be triaged  Pt changed into paper scrubs and allowed for vitals to be taken  No urine sample has been provided at this time  Rosalinda Helton, called to speak with Pt  Melinda Celaya told that the Pt will call her when he has been settled and seen by a provider  Maryam Pradhan left her number  [phone]        [facility]  07/05/18 3119

## 2018-07-05 NOTE — ED PROVIDER NOTES
History  Chief Complaint   Patient presents with    Psychiatric Evaluation     pt states he doesn't feel right, per EMS parrents stated that pt ran away last night and that there not sure if he has been taking his meds appropriattheodore, pt admits to SI with no specific plan        History provided by:  Patient  Psychiatric Evaluation   Presenting symptoms: depression, hallucinations, suicidal thoughts, suicidal threats and suicide attempt    Degree of incapacity (severity):  Severe  Onset quality:  Gradual  Duration:  1 month  Timing:  Constant  Progression:  Worsening  Chronicity:  Chronic  Context: drug abuse (cocaoine)    Context: not alcohol use    Treatment compliance:  Some of the time  Relieved by:  Nothing  Worsened by:  Drugs, lack of sleep and family interactions  Ineffective treatments:  Antidepressants and mood stabilizers  Associated symptoms: feelings of worthlessness, irritability and poor judgment    Risk factors: hx of mental illness and recent psychiatric admission (was just admitted at [facility] for similar)        Prior to Admission Medications   Prescriptions Last Dose Informant Patient Reported?  Taking?   hydrOXYzine HCL (ATARAX) 50 mg tablet   No No   Sig: Take 1 tablet (50 mg total) by mouth every 8 (eight) hours as needed (mild anxiety) for up to 30 days   lithium 600 MG capsule   No No   Sig: Take 1 capsule (600 mg total) by mouth daily after dinner for 30 days   methadone (METHADOSE) 40 MG disintegrating tablet  Self Yes No   Sig: Take 137 mg by mouth daily in the early morning     prazosin (MINIPRESS) 1 mg capsule   No No   Sig: Take 1 capsule (1 mg total) by mouth daily at bedtime for 30 days   risperiDONE (RisperDAL M-TABS) 1 mg dispersible tablet   No No   Sig: Take 1 tablet (1 mg total) by mouth daily for 30 days In morning   risperiDONE (RisperDAL M-TABS) 2 mg dispersible tablet   No No   Sig: Take 1 tablet (2 mg total) by mouth daily after dinner for 30 days      Facility-Administered Medications: None       Past Medical History:   Diagnosis Date    Alcoholism ([facility] )     Anxiety     Bipolar 1 disorder (HCC)     Depression     Excessive daytime sleepiness     Hallucination     Idiopathic hypersomnia     Psychiatric disorder     Psychiatric illness     Schizophrenia ([place] [address] )     Substance abuse     Suicide attempt ([place] [address] )     hx of attempts by hanging and OD in past 3-4 months    Tobacco use        Past Surgical History:   Procedure Laterality Date    INCISION AND DRAINAGE OF WOUND Right 4/26/2018    Procedure: INCISION AND DRAINAGE (I&D) EXTREMITY, washout and packing;  Surgeon: Carmina Nielsen MD;  Location: [facility] OR;  Service: General    SHOULDER SURGERY Right     titanium plate       Family History   Problem Relation Age of Onset    No Known Problems Mother     No Known Problems Father     No Known Problems Sister     No Known Problems Brother     No Known Problems Maternal Aunt     No Known Problems Paternal Aunt     No Known Problems Maternal Uncle     No Known Problems Paternal Uncle     No Known Problems Maternal Grandfather     No Known Problems Maternal Grandmother     No Known Problems Paternal Grandfather     No Known Problems Paternal Grandmother     No Known Problems Cousin     ADD / ADHD Neg Hx     Alcohol abuse Neg Hx     Anxiety disorder Neg Hx     Bipolar disorder Neg Hx     Completed Suicide  Neg Hx     Dementia Neg Hx     Depression Neg Hx     Drug abuse Neg Hx     OCD Neg Hx     Psychiatric Illness Neg Hx     Psychosis Neg Hx     Schizoaffective Disorder  Neg Hx     Schizophrenia Neg Hx     Self-Injury Neg Hx     Suicide Attempts Neg Hx      I have reviewed and agree with the history as documented      Social History   Substance Use Topics    Smoking status: Current Every Day Smoker     Packs/day: 2 00     Years: 22 00     Types: Cigarettes    Smokeless tobacco: Never Used    Alcohol use No      Comment: drinks 1-2 drinks yearly Review of Systems   Constitutional: Positive for irritability  Psychiatric/Behavioral: Positive for hallucinations and suicidal ideas  All other systems reviewed and are negative  Physical Exam  Physical Exam   Constitutional: He is oriented to person, place, and time  He appears well-developed and well-nourished  No distress  HENT:   Head: Normocephalic and atraumatic  Eyes: EOM are normal    Neck: Neck supple  Cardiovascular: Normal rate  Pulmonary/Chest: Effort normal    Abdominal: Soft  Musculoskeletal: Normal range of motion  Neurological: He is alert and oriented to person, place, and time  Skin: He is not diaphoretic  No pallor  Psychiatric: His affect is labile  He expresses impulsivity  He exhibits a depressed mood  He expresses suicidal ideation  He expresses suicidal plans  Vitals reviewed        Vital Signs  ED Triage Vitals   Temperature Pulse Respirations Blood Pressure SpO2   07/05/18 1751 07/05/18 1751 07/05/18 1751 07/05/18 1751 07/05/18 1751   98 °F (36 7 °C) 78 16 123/65 98 %      Temp Source Heart Rate Source Patient Position - Orthostatic VS BP Location FiO2 (%)   07/05/18 1751 07/05/18 1751 07/05/18 2154 07/05/18 1751 --   Oral Monitor Lying Left arm       Pain Score       07/05/18 2154       No Pain           Vitals:    07/05/18 1751 07/05/18 2154 07/06/18 0157   BP: 123/65 107/56 108/57   Pulse: 78 70 (!) 52   Patient Position - Orthostatic VS:  Lying Lying       Visual Acuity      ED Medications  Medications - No data to display    Diagnostic Studies  Results Reviewed     Procedure Component Value Units Date/Time    Rapid drug screen, urine [[id number]]  (Abnormal) Collected:  07/05/18 2344    Lab Status:  Final result Specimen:  Urine from Urine, Clean Catch Updated:  07/05/18 2358     Amph/Meth UR Positive (A)     Barbiturate Ur Negative     Benzodiazepine Urine Negative     Cocaine Urine Positive (A)     Methadone Urine Positive (A)     Opiate Urine Negative PCP Ur Negative     THC Urine Positive (A)    Narrative:         Presumptive report  If requested, specimen will be sent to reference lab for confirmation  FOR MEDICAL PURPOSES ONLY  IF CONFIRMATION NEEDED PLEASE CONTACT THE LAB WITHIN 5 DAYS  Drug Screen Cutoff Levels:  AMPHETAMINE/METHAMPHETAMINES  1000 ng/mL  BARBITURATES     200 ng/mL  BENZODIAZEPINES     200 ng/mL  COCAINE      300 ng/mL  METHADONE      300 ng/mL  OPIATES      300 ng/mL  PHENCYCLIDINE     25 ng/mL  THC       50 ng/mL    Basic metabolic panel [[id number]] Collected:  07/05/18 1916    Lab Status:  Final result Specimen:  Blood from Arm, Right Updated:  07/05/18 1951     Sodium 140 mmol/L      Potassium 4 3 mmol/L      Chloride 104 mmol/L      CO2 25 mmol/L      Anion Gap 11 mmol/L      BUN 12 mg/dL      Creatinine 0 87 mg/dL      Glucose 103 mg/dL      Calcium 8 6 mg/dL      eGFR 113 ml/min/1 73sq m     Narrative:         National Kidney Disease Education Program recommendations are as follows:  GFR calculation is accurate only with a steady state creatinine  Chronic Kidney disease less than 60 ml/min/1 73 sq  meters  Kidney failure less than 15 ml/min/1 73 sq  meters      Ethanol [[id number]]  (Normal) Collected:  07/05/18 1916    Lab Status:  Final result Specimen:  Blood from Arm, Right Updated:  07/05/18 1951     Ethanol Lvl <3 mg/dL     Salicylate level [[id number]]  (Abnormal) Collected:  07/05/18 1916    Lab Status:  Final result Specimen:  Blood from Arm, Right Updated:  99/87/54 9571     Salicylate Lvl <3 (L) mg/dL     Acetaminophen level [[id number]]  (Abnormal) Collected:  07/05/18 1916    Lab Status:  Final result Specimen:  Blood from Arm, Right Updated:  07/05/18 1951     Acetaminophen Level <2 0 (L) ug/mL     CBC and differential [[id number]]  (Abnormal) Collected:  07/05/18 1916    Lab Status:  Final result Specimen:  Blood from Arm, Right Updated:  07/05/18 1923     WBC 10 43 (H) Thousand/uL      RBC 4 38 Million/uL      Hemoglobin 12 7 g/dL      Hematocrit 38 9 %      MCV 89 fL      MCH 29 0 pg      MCHC 32 6 g/dL      RDW 12 7 %      MPV 11 8 fL      Platelets 077 Thousands/uL      nRBC 0 /100 WBCs      Neutrophils Relative 65 %      Immat GRANS % 0 %      Lymphocytes Relative 21 %      Monocytes Relative 9 %      Eosinophils Relative 4 %      Basophils Relative 1 %      Neutrophils Absolute 6 82 Thousands/µL      Immature Grans Absolute 0 03 Thousand/uL      Lymphocytes Absolute 2 19 Thousands/µL      Monocytes Absolute 0 90 Thousand/µL      Eosinophils Absolute 0 40 Thousand/µL      Basophils Absolute 0 09 Thousands/µL                  No orders to display              Procedures  ECG 12 Lead Documentation  Date/Time: 7/5/2018 7:20 PM  Performed by: Moira Goldsmith  Authorized by: Moira Goldsmith     Indications / Diagnosis:  Overdose  ECG reviewed by me, the ED Provider: yes    Patient location:  ED  Rate:     ECG rate:  83    ECG rate assessment: normal    Rhythm:     Rhythm: sinus rhythm    ST segments:     ST segments:  Normal  T waves:     T waves: normal             Phone Contacts  ED Phone Contact    ED Course                               MDM  Number of Diagnoses or Management Options  Suicidal ideation: new and requires workup     Amount and/or Complexity of Data Reviewed  Clinical lab tests: ordered and reviewed  Discuss the patient with other providers: yes  Independent visualization of images, tracings, or specimens: yes    Risk of Complications, Morbidity, and/or Mortality  Presenting problems: high    Patient Progress  Patient progress: stable    CritCare Time    Disposition  Final diagnoses:   Suicidal ideation     Time reflects when diagnosis was documented in both MDM as applicable and the Disposition within this note     Time User Action Codes Description Comment    7/6/2018 10:39 PM Mina Jese Barlowland Pricila Suicidal ideation       ED Disposition     ED Disposition Condition Comment    Transfer to Another Facility Giovana Waller should be transferred out to [address] [place] [address]        MD Documentation      Most Recent Value   Patient Condition  The patient has been stabilized such that within reasonable medical probability, no material deterioration of the patient condition or the condition of the unborn child(tima) is likely to result from the transfer   Reason for Transfer  Level of Care needed not available at this facility   Benefits of Transfer  Continuity of care   Risks of Transfer  Potential for delay in receiving treatment   Accepting Physician  Dr Alvaro Brooks Name, [address] [facility] [address]    (Name & Tel number)  Tracey Stephen [phone] and CHAITANYA Estevez @ [phone]   Transported by [employer] and Unit #)  [employer]    Sending MD  [facility] MD   Provider Certification  General risk, such as traffic hazards, adverse weather conditions, rough terrain or turbulence, possible failure of equipment (including vehicle or aircraft), or consequences of actions of persons outside the control of the transport personnel      RN Documentation      Most [facility] Name, [address] [facility] [address]    (Name & Tel number)  Tracey Stephen [phone] and CHAITANYA [place] @ [phone]   Report Given to  [employer]   Transported by [employer] and Unit #)  [facility]    Level of Care  Basic life support   Transfer Date  07/06/18   Transfer Time  0230      Follow-up Information    None         Discharge Medication List as of 7/6/2018  4:12 AM      CONTINUE these medications which have NOT CHANGED    Details   hydrOXYzine HCL (ATARAX) 50 mg tablet Take 1 tablet (50 mg total) by mouth every 8 (eight) hours as needed (mild anxiety) for up to 30 days, Starting Fri 6/29/2018, Until Sun 7/29/2018, Print      lithium 600 MG capsule Take 1 capsule (600 mg total) by mouth daily after dinner for 30 days, Starting Fri 6/29/2018, Until Sun 7/29/2018, Print      methadone (METHADOSE) 40 MG disintegrating tablet Take 137 mg by mouth daily in the early morning  , Historical Med      prazosin (MINIPRESS) 1 mg capsule Take 1 capsule (1 mg total) by mouth daily at bedtime for 30 days, Starting Fri 6/29/2018, Until Sun 7/29/2018, Print      !! risperiDONE (RisperDAL M-TABS) 1 mg dispersible tablet Take 1 tablet (1 mg total) by mouth daily for 30 days In morning, Starting Fri 6/29/2018, Until Sun 7/29/2018, Print      !! risperiDONE (RisperDAL M-TABS) 2 mg dispersible tablet Take 1 tablet (2 mg total) by mouth daily after dinner for 30 days, Starting Fri 6/29/2018, Until Sun 7/29/2018, Print       !! - Potential duplicate medications found  Please discuss with provider  No discharge procedures on file      ED Provider  Electronically Signed by           Jany Ghotra MD  07/10/18 6600

## 2018-07-05 NOTE — ED NOTES
Pts belongings were collected and logged  Pts belonging were placed in locker 1  Crisis worker and Provider at bedside   Pt will continued to be  monitored      Pearletha Simmonds  07/05/18 9371

## 2018-07-05 NOTE — LETTER
[address]  [facility] [address]  Dept: [phone]            EOUWQK TRANSFER CONSENT    NAME Radha Almonte                                         1985                              MRN [id number]    I have been informed of my rights regarding examination, treatment, and transfer   by Dr Jono Damon MD    Benefits: Continuity of care    Risks: Potential for delay in receiving treatment      { ED EMTALA TRANSFER CHOICES:[id number]}    I authorize the performance of emergency medical procedures and treatments upon me in both transit and upon arrival at the receiving facility  Additionally, I authorize the release of any and all medical records to the receiving facility and request they be transported with me, if possible  I understand that the safest mode of transportation during a medical emergency is an ambulance and that the Hospital advocates the use of this mode of transport  Risks of traveling to the receiving facility by car, including absence of medical control, life sustaining equipment, such as oxygen, and medical personnel has been explained to me and I fully understand them  (YRIS CORRECT BOX BELOW)  [ X ]  I consent to the stated transfer and to be transported by ambulance/helicopter  [  ]  I consent to the stated transfer, but refuse transportation by ambulance and accept full responsibility for my transportation by car    I understand the risks of non-ambulance transfers and I exonerate the Hospital and its staff from any deterioration in my condition that results from this refusal     X___________________________________________    DATE  18  TIME__12:50______  Signature of patient or legally responsible individual signing on patient behalf           RELATIONSHIP TO PATIENT_________________________                                    Provider Certification    NAME Radha Almonte  1985                              MRN [id number]    A medical screening exam was performed on the above named patient  Based on the examination:    Condition Necessitating Transfer SI  Patient Condition: The patient has been stabilized such that within reasonable medical probability, no material deterioration of the patient condition or the condition of the unborn child(tima) is likely to result from the transfer    Reason for Transfer: Level of Care needed not available at this facility    Transfer Requirements: [facility] [address]   · Space available and qualified personnel available for treatment as acknowledged by Shar Montes [phone] and CHAITANYA Martin @ [phone]  · Agreed to accept transfer and to provide appropriate medical treatment as acknowledged by       Dr Hoda Jauregui  · Appropriate medical records of the examination and treatment of the patient are provided at the time of transfer   [address] [facility] __A  A ___  · Transfer will be performed by qualified personnel from [facility]   and appropriate transfer equipment as required, including the use of necessary and appropriate life support measures      Provider Certification: I have examined the patient and explained the following risks and benefits of being transferred/refusing transfer to the patient/family:  General risk, such as traffic hazards, adverse weather conditions, rough terrain or turbulence, possible failure of equipment (including vehicle or aircraft), or consequences of actions of persons outside the control of the transport personnel      Based on these reasonable risks and benefits to the patient and/or the unborn child(tima), and based upon the information available at the time of the patients examination, I certify that the medical benefits reasonably to be expected from the provision of appropriate medical treatments at another medical facility outweigh the increasing risks, if any, to the individuals medical condition, and in the case of labor to the unborn child, from effecting the transfer      X____________________________________________ DATE 07/06/18        TIME_______      ORIGINAL - SEND TO MEDICAL RECORDS   COPY - SEND WITH PATIENT DURING TRANSFER

## 2018-07-05 NOTE — ED NOTES
Pt is very agitated and throwing things in the room  Pt yelled "I want to get out of here, give me things,  I'm leaving"  Pt is standing the doorway of the room staring at the nurses station through the window   Security notified and came to assist       Ericka Guaman  07/05/18 7744

## 2018-07-05 NOTE — ED NOTES
Received report from [facility]  Pt is currently laying in bed and watching television  No signs of distress at this time  Will continue to monitor        Tura Needles  07/05/18 1922

## 2018-07-05 NOTE — LETTER
Section I - General Information    Name of Patient: Simran Pineda                 : 1985    Medicare #:____________________  Transport Date: 18 (PCS is valid for round trips on this date and for all repetitive trips in the 60-day range as noted below )  Origin[address]                                                         Destination:____SLQ____________________________________________  Is the pt's stay covered under Medicare Part A (PPS/DRG)     (X_) YES  (_) NO  Closest appropriate facility? (X_) YES  (_) NO  If no, why is transport to more distant facility required?________________________  If hosp-hosp transfer, describe services needed at 2nd facility not available at 1st facility? [facility]____________________  If hospice pt, is this transport related to pt's terminal illness? (_) YES (_) NO Describe____________________________________    Section II - Medical Necessity Questionnaire  Ambulance transportation is medically necessary only if other means of transport are contraindicated or would be potentially harmful to the patient  To meet this requirement, the patient must either be "bed confined" or suffer from a condition such that transport by means other than ambulance is contraindicated by the patient's condition   The following questions must be answered by the medical professional signing below for this form to be valid:    1)  Describe the MEDICAL CONDITION (physical and/or mental) of this patient AT [address] [place] [address] that requires the patient to be transported in an ambulance and why transport by other means is contraindicated by the patient's condition:______________SI____________________________________________________________________________________    2) Is the patient "bed confined" as defined below?     (_) YES  (X_) NO  To be "be confined" the patient must satisfy all three of the following conditions: (1) unable to get up from bed without Assistance; AND (2) unable to ambulate; AND (3) unable to sit in a chair or wheelchair  3) Can this patient safely be transported by car or wheelchair [place] (i e , seated during transport without a medical attendant or monitoring)?   (_) YES  (X_) NO    4) In addition to completing questions 1-3 above, please check any of the following conditions that apply*:  *Note: supporting documentation for any boxes checked must be maintained in the patient's medical records  (_)Contractures   (_)Non-Healed Fractures  (_)Patient is confused (_)Patient is comatose (_)Moderate/severe pain on movement (_X)Danger to self/others  (_)IV meds/fluids required (_)Patient is combative(_)Need or possible need for restraints (_)DVT requires elevation of lower extremity  (_)Medical attendant required (_)Requires oxygen-unable to self administer (_)Special handling/isolation/infection control precautions required (_)Unable to tolerate seated position for time needed to transport (_)Hemodynamic monitoring required en route (_)Unable to sit in a chair or wheelchair due to decubitus ulcers or other wounds (_)Cardiac monitoring required en route (_)Morbid obesity requires additional personnel/equipment to safely handle patient (_)Orthopedic device (backboard, halo, pins, traction, brace, wedge, etc,) requiring special handling during transport (_)Other(specify)_______________________________________________    Section III - Signature of Physician or Healthcare Professional  I certify that the above information is true and correct based on my evaluation of this patient, and represent that the patient requires transport by ambulance and that other forms of transport are contraindicated   I understand that this information will be used by the Centers for Medicare and Medicaid Services (CMS) to support the determination of medical necessity for ambulance services, and I represent that I have personal knowledge of the patient's condition at time of transport  (_) If this box is checked, I also certify that the patient is physically or mentally incapable of signing the ambulance service's claim and that the institution with which I am affiliated has furnished care, services, or assistance to the patient  My signature below is made on behalf of the patient pursuant to 42 CFR §424 36(b)(4)  In accordance with 42 CFR §424 37, the specific reason(s) that the patient is physically or mentally incapable of signing the claim form is as follows: _________________________________________________________________________________________________________      Signature of Physician* or Healthcare Professional______________________________________________________________  Signature Date 07/06/18 (For scheduled repetitive transports, this form is not valid for transports performed more than 60 days after this date)    Printed Name & Credentials of Physician or Healthcare Professional (MD, DO, RN, etc )__A  MAYUR Pruitt______________  *Form must be signed by patient's attending physician for scheduled, repetitive transports   For non-repetitive, unscheduled ambulance transports, if unable to obtain the signature of the attending physician, any of the following may sign (choose appropriate option below)  (_) Physician Assistant (_)  Clinical Nurse Specialist (_)  Registered Nurse  (_)  Nurse Practitioner  (X_) Discharge Planner

## 2018-07-05 NOTE — ED NOTES
CW began interview with pt who stated he was D/C'ed from [facility] on 6/30/18 and is currently experiencing SI with NO plan  Pt states that he has no money with which to purchase his psych meds, and last night, somewhere between 12 - 3AM pt ingested 30-40 Trazadone TABS, 100mg each  At this time Dr Alie Shields met with pt and will order labs  CW will assess pt once he is medically cleared

## 2018-07-05 NOTE — ED NOTES
Pt is becoming agitated  Pt continues to ask for the tv but has been told we need to finish his work up  Pt stated "you should have told me that"  Pt is sitting at the foot of his bed staring at window to nurses station        Sabrina Hardy  07/05/18 0516

## 2018-07-06 ENCOUNTER — HOSPITAL ENCOUNTER (INPATIENT)
Facility: HOSPITAL | Age: 33
LOS: 5 days | Discharge: HOME | DRG: 885 | End: 2018-07-11
Attending: PSYCHIATRY & NEUROLOGY | Admitting: PSYCHIATRY & NEUROLOGY
Payer: MEDICARE

## 2018-07-06 VITALS
HEART RATE: 52 BPM | DIASTOLIC BLOOD PRESSURE: 57 MMHG | SYSTOLIC BLOOD PRESSURE: 108 MMHG | OXYGEN SATURATION: 98 % | TEMPERATURE: 98 F | RESPIRATION RATE: 18 BRPM

## 2018-07-06 DIAGNOSIS — F33.2 MAJOR DEPRESSIVE DISORDER, RECURRENT SEVERE WITHOUT PSYCHOTIC FEATURES (HCC): Primary | ICD-10-CM

## 2018-07-06 DIAGNOSIS — F11.20 METHADONE DEPENDENCE (HCC): Primary | ICD-10-CM

## 2018-07-06 DIAGNOSIS — F25.0 SCHIZOAFFECTIVE DISORDER, BIPOLAR TYPE (HCC): ICD-10-CM

## 2018-07-06 DIAGNOSIS — F43.10 PTSD (POST-TRAUMATIC STRESS DISORDER): ICD-10-CM

## 2018-07-06 DIAGNOSIS — F33.2 MAJOR DEPRESSIVE DISORDER, RECURRENT SEVERE WITHOUT PSYCHOTIC FEATURES (HCC): ICD-10-CM

## 2018-07-06 PROBLEM — F15.10 METHAMPHETAMINE ABUSE (HCC): Status: ACTIVE | Noted: 2018-07-06

## 2018-07-06 LAB
ATRIAL RATE: 61 BPM
P AXIS: 66 DEGREES
PR INTERVAL: 170 MS
QRS AXIS: 82 DEGREES
QRSD INTERVAL: 94 MS
QT INTERVAL: 434 MS
QTC INTERVAL: 436 MS
T WAVE AXIS: 72 DEGREES
VENTRICULAR RATE: 61 BPM

## 2018-07-06 PROCEDURE — 99222 1ST HOSP IP/OBS MODERATE 55: CPT | Performed by: PSYCHIATRY & NEUROLOGY

## 2018-07-06 PROCEDURE — 99285 EMERGENCY DEPT VISIT HI MDM: CPT

## 2018-07-06 PROCEDURE — 99221 1ST HOSP IP/OBS SF/LOW 40: CPT | Performed by: PHYSICIAN ASSISTANT

## 2018-07-06 RX ORDER — HALOPERIDOL 5 MG/ML
5 INJECTION INTRAMUSCULAR EVERY 6 HOURS PRN
Status: CANCELLED | OUTPATIENT
Start: 2018-07-06 | End: 2019-07-06

## 2018-07-06 RX ORDER — METHADONE HYDROCHLORIDE 10 MG/1
100 TABLET ORAL DAILY
Status: DISCONTINUED | OUTPATIENT
Start: 2018-07-06 | End: 2018-07-11 | Stop reason: HOSPADM

## 2018-07-06 RX ORDER — HYDROXYZINE HYDROCHLORIDE 25 MG/1
25 TABLET, FILM COATED ORAL EVERY 4 HOURS PRN
Status: DISCONTINUED | OUTPATIENT
Start: 2018-07-06 | End: 2018-07-06

## 2018-07-06 RX ORDER — LITHIUM CARBONATE 300 MG/1
900 CAPSULE ORAL
Status: DISCONTINUED | OUTPATIENT
Start: 2018-07-06 | End: 2018-07-11 | Stop reason: HOSPADM

## 2018-07-06 RX ORDER — IBUPROFEN 600 MG/1
600 TABLET ORAL EVERY 8 HOURS PRN
Status: CANCELLED | OUTPATIENT
Start: 2018-07-06 | End: 2019-07-06

## 2018-07-06 RX ORDER — IBUPROFEN 600 MG/1
600 TABLET ORAL EVERY 8 HOURS PRN
Status: DISCONTINUED | OUTPATIENT
Start: 2018-07-06 | End: 2018-07-11 | Stop reason: HOSPADM

## 2018-07-06 RX ORDER — MAGNESIUM HYDROXIDE/ALUMINUM HYDROXICE/SIMETHICONE 120; 1200; 1200 MG/30ML; MG/30ML; MG/30ML
30 SUSPENSION ORAL EVERY 4 HOURS PRN
Status: DISCONTINUED | OUTPATIENT
Start: 2018-07-06 | End: 2018-07-11 | Stop reason: HOSPADM

## 2018-07-06 RX ORDER — BENZTROPINE MESYLATE 1 MG/ML
1 INJECTION INTRAMUSCULAR; INTRAVENOUS EVERY 6 HOURS PRN
Status: DISCONTINUED | OUTPATIENT
Start: 2018-07-06 | End: 2018-07-11 | Stop reason: HOSPADM

## 2018-07-06 RX ORDER — PRAZOSIN HYDROCHLORIDE 1 MG/1
1 CAPSULE ORAL
Status: DISCONTINUED | OUTPATIENT
Start: 2018-07-06 | End: 2018-07-11 | Stop reason: HOSPADM

## 2018-07-06 RX ORDER — BENZTROPINE MESYLATE 1 MG/1
1 TABLET ORAL EVERY 6 HOURS PRN
Status: DISCONTINUED | OUTPATIENT
Start: 2018-07-06 | End: 2018-07-11 | Stop reason: HOSPADM

## 2018-07-06 RX ORDER — HALOPERIDOL 5 MG
5 TABLET ORAL EVERY 6 HOURS PRN
Status: DISCONTINUED | OUTPATIENT
Start: 2018-07-06 | End: 2018-07-11 | Stop reason: HOSPADM

## 2018-07-06 RX ORDER — BENZTROPINE MESYLATE 1 MG/ML
1 INJECTION INTRAMUSCULAR; INTRAVENOUS EVERY 6 HOURS PRN
Status: CANCELLED | OUTPATIENT
Start: 2018-07-06 | End: 2019-07-06

## 2018-07-06 RX ORDER — METHADONE HYDROCHLORIDE 10 MG/ML
100 CONCENTRATE ORAL DAILY
Status: DISCONTINUED | OUTPATIENT
Start: 2018-07-06 | End: 2018-07-06

## 2018-07-06 RX ORDER — ACETAMINOPHEN 325 MG/1
650 TABLET ORAL EVERY 6 HOURS PRN
Status: CANCELLED | OUTPATIENT
Start: 2018-07-06 | End: 2019-07-06

## 2018-07-06 RX ORDER — TRAZODONE HYDROCHLORIDE 50 MG/1
50 TABLET ORAL
Status: DISCONTINUED | OUTPATIENT
Start: 2018-07-06 | End: 2018-07-11 | Stop reason: HOSPADM

## 2018-07-06 RX ORDER — HALOPERIDOL 5 MG
5 TABLET ORAL EVERY 6 HOURS PRN
Status: CANCELLED | OUTPATIENT
Start: 2018-07-06 | End: 2019-07-06

## 2018-07-06 RX ORDER — METHADONE HYDROCHLORIDE 40 MG/1
137 TABLET ORAL
Status: DISCONTINUED | OUTPATIENT
Start: 2018-07-06 | End: 2018-07-06

## 2018-07-06 RX ORDER — HYDROXYZINE HYDROCHLORIDE 25 MG/1
25 TABLET, FILM COATED ORAL EVERY 4 HOURS PRN
Status: CANCELLED | OUTPATIENT
Start: 2018-07-06 | End: 2019-07-06

## 2018-07-06 RX ORDER — NICOTINE 21 MG/24HR
1 PATCH, TRANSDERMAL 24 HOURS TRANSDERMAL DAILY
Status: DISCONTINUED | OUTPATIENT
Start: 2018-07-06 | End: 2018-07-11 | Stop reason: HOSPADM

## 2018-07-06 RX ORDER — HALOPERIDOL 5 MG/ML
5 INJECTION INTRAMUSCULAR EVERY 6 HOURS PRN
Status: DISCONTINUED | OUTPATIENT
Start: 2018-07-06 | End: 2018-07-11 | Stop reason: HOSPADM

## 2018-07-06 RX ORDER — NICOTINE 21 MG/24HR
1 PATCH, TRANSDERMAL 24 HOURS TRANSDERMAL DAILY
Status: CANCELLED | OUTPATIENT
Start: 2018-07-06 | End: 2019-07-06

## 2018-07-06 RX ORDER — BENZTROPINE MESYLATE 1 MG/1
1 TABLET ORAL EVERY 6 HOURS PRN
Status: CANCELLED | OUTPATIENT
Start: 2018-07-06 | End: 2019-07-06

## 2018-07-06 RX ORDER — HYDROXYZINE 50 MG/1
50 TABLET, FILM COATED ORAL EVERY 8 HOURS PRN
Status: DISCONTINUED | OUTPATIENT
Start: 2018-07-06 | End: 2018-07-11 | Stop reason: HOSPADM

## 2018-07-06 RX ORDER — RISPERIDONE 1 MG/1
1 TABLET, ORALLY DISINTEGRATING ORAL DAILY
Status: DISCONTINUED | OUTPATIENT
Start: 2018-07-06 | End: 2018-07-11 | Stop reason: HOSPADM

## 2018-07-06 RX ORDER — MAGNESIUM HYDROXIDE/ALUMINUM HYDROXICE/SIMETHICONE 120; 1200; 1200 MG/30ML; MG/30ML; MG/30ML
30 SUSPENSION ORAL EVERY 4 HOURS PRN
Status: CANCELLED | OUTPATIENT
Start: 2018-07-06 | End: 2019-07-06

## 2018-07-06 RX ORDER — TRAZODONE HYDROCHLORIDE 50 MG/1
50 TABLET ORAL
Status: CANCELLED | OUTPATIENT
Start: 2018-07-06 | End: 2019-07-06

## 2018-07-06 RX ORDER — RISPERIDONE 2 MG/1
2 TABLET, ORALLY DISINTEGRATING ORAL
Status: DISCONTINUED | OUTPATIENT
Start: 2018-07-06 | End: 2018-07-11 | Stop reason: HOSPADM

## 2018-07-06 RX ORDER — ACETAMINOPHEN 325 MG/1
650 TABLET ORAL EVERY 6 HOURS PRN
Status: DISCONTINUED | OUTPATIENT
Start: 2018-07-06 | End: 2018-07-11 | Stop reason: HOSPADM

## 2018-07-06 RX ORDER — METHADONE HYDROCHLORIDE 40 MG/1
100 TABLET ORAL
Status: DISCONTINUED | OUTPATIENT
Start: 2018-07-07 | End: 2018-07-06

## 2018-07-06 RX ADMIN — METHADONE HYDROCHLORIDE 100 MG: 10 TABLET ORAL at 10:30

## 2018-07-06 RX ADMIN — PRAZOSIN HYDROCHLORIDE 1 MG: 1 CAPSULE ORAL at 23:03

## 2018-07-06 RX ADMIN — LITHIUM CARBONATE 900 MG: 300 CAPSULE, GELATIN COATED ORAL at 18:50

## 2018-07-06 RX ADMIN — RISPERIDONE 1 MG: 1 TABLET, ORALLY DISINTEGRATING ORAL at 10:31

## 2018-07-06 RX ADMIN — RISPERIDONE 2 MG: 2 TABLET, ORALLY DISINTEGRATING ORAL at 18:50

## 2018-07-06 RX ADMIN — NICOTINE 1 PATCH: 21 PATCH, EXTENDED RELEASE TRANSDERMAL at 10:33
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
## 2018-07-06 NOTE — ED NOTES
Pt is currently resting in his bed with his eyes closed  No signs of distress at this time  Will continue to monitor        Breana Myers  07/06/18 0002

## 2018-07-06 NOTE — TREATMENT PLAN
TREATMENT PLAN REVIEW - [address] 33 y o  1985 male MRN: [id number]    [address] [place] [address] Room / Bed: [facility] 253/[facility] 780-48 Encounter: [id number]          Admit Date/Time:  7/6/2018  4:12 AM    Treatment Team: Attending Provider: Luz Casey; Patient Care Assistant: Elroy Denny;  Charge Nurse: Duong Gilman RN; Patient Care Technician: Geri Pruitt; Medications RN: Mannie Alarcon RN; Occupational Therapy Assistant: STEWART Ferrer; Patient Care Assistant: Viktoria Wetzel    Diagnosis: Principal Problem:    Schizoaffective disorder, bipolar type ([facility] 75 )  Active Problems:    Methadone dependence ([facility] 75 )    PTSD (post-traumatic stress disorder)    Cocaine abuse    Methamphetamine abuse    Patient Strengths/Assets: cooperative, good past treatment response, motivation for treatment/growth, patient is on a voluntary commitment    Patient Barriers/Limitations: limited support system, low self esteem, noncompliant with treatment, substance abuse    Short Term Goals: decrease in depressive symptoms, decrease in anxiety symptoms, decrease in paranoid thoughts, decrease in psychotic symptoms, decrease in suicidal thoughts    Long Term Goals: improvement in depression, improvement in anxiety, stabilization of mood, free of suicidal thoughts, resolution of psychotic symptoms, acceptance of need for psychiatric medications, acceptance of need for psychiatric treatment, acceptance of need for psychiatric follow up after discharge    Progress Towards Goals: starting psychiatric medications as prescribed    Recommended Treatment: medication management, patient medication education, group therapy, milieu therapy, continued Behavioral Health psychiatric evaluation/assessment process    Treatment Frequency: daily medication monitoring, group and milieu therapy daily, monitoring through interdisciplinary rounds, monitoring through weekly patient care conferences    Expected Discharge Date: 5-7 days    Discharge Plan: referral for outpatient medication management with a psychiatrist, referral for outpatient psychotherapy    Treatment Plan Created/Updated By: Sandy Kingston

## 2018-07-06 NOTE — PROGRESS NOTES
Pt pleasant during interaction  Discussed stressors leading to admission  States he returned to the streets after recent discharge, did not continue medications, and has been using multiple substances  Reports daily cocaine use and says "I haven't slept since I left here " pt denies SI and contracts for safety  Is not interested in rehab at discharge  Feels it is not helpful  Pt has been weaning off methadone and is planning to receive vivitrol injection in future  Cooperative on unit  No concerns at this time

## 2018-07-06 NOTE — ED NOTES
Pt is currently resting in his room with his eyes closed  Stayed asleep during vitals but was woken to let him know that he would be leaving soon  No signs of distress  Will continue to monitor        Breana Myers  07/06/18 0159

## 2018-07-06 NOTE — PROGRESS NOTES
201 from [facility]  Pt recently discharged [facility] on June 30  SI with no plan but admitted to taking 3-50 Trazodone tab 100 mg  Also told ED that he attempted to hang himself a couple times in the [place]  See Dr Vipin Marie for med mgmt and [facility] for Methadone Maintenance  Presented disheveled  Flat affect  No complaints of pain  UDS + for Meth, Cocaine, Methadone and THC  Denies SI, HI, and Visual Hallucinations  Reports Command Auditory Hallucinations to hurt self  Pt contracts for safety

## 2018-07-06 NOTE — PLAN OF CARE
Problem: DISCHARGE PLANNING  Goal: Discharge to home or other facility with appropriate resources  INTERVENTIONS:  - Identify barriers to discharge w/patient and caregiver  - Arrange for needed discharge resources and transportation as appropriate  - Identify discharge learning needs (meds, wound care, etc )  - Arrange for interpretive services to assist at discharge as needed  - Refer to Case Management Department for coordinating discharge planning if the patient needs post-hospital services based on physician/advanced practitioner order or complex needs related to functional status, cognitive ability, or social support system  Outcome: Progressing      Problem: Ineffective Coping  Goal: Cooperates with admission process  Interventions:   - Complete admission process  Outcome: Completed Date Met: 07/06/18    Goal: Identifies ineffective coping skills  Outcome: Progressing    Goal: Identifies healthy coping skills  Outcome: Progressing    Goal: Demonstrates healthy coping skills  Outcome: Progressing    Goal: Participates in unit activities  Interventions:  - Provide therapeutic environment   - Provide required programming   - Redirect inappropriate behaviors   Outcome: Progressing    Goal: Patient/Family participate in treatment and DC plans  Interventions:  - Provide therapeutic environment  Outcome: Progressing      Problem: SELF HARM/SUICIDALITY  Goal: Will have no self-injury during hospital stay  INTERVENTIONS:  - Q 15 MINUTES: Routine safety checks  - Q WAKING SHIFT & PRN: Assess risk to determine if routine checks are adequate to maintain patient safety  - Encourage patient to participate actively in care by formulating a plan to combat response to suicidal ideation, identify supports and resources  Outcome: Progressing      Problem: DEPRESSION  Goal: Will be euthymic at discharge  INTERVENTIONS:  - Administer medication as ordered  - Provide emotional support via 1:1 interaction with staff  - Encourage involvement in milieu/groups/activities  - Monitor for social isolation  Outcome: Progressing

## 2018-07-06 NOTE — ED NOTES
Pt asked for food, was provided with two sandwiches from the [facility] refrigerator  Pt is currently laying in bed and eating  No signs of distress at this time  Will continue to monitor        Nadeem Ray  07/05/18 2045

## 2018-07-06 NOTE — ED NOTES
CW spoke with Supervisor Jake Garcia of [facility] who requested that Washio fax pt's [phone] to her, for review at 8:00 AM in the morning when the Washio arrives  CW did so

## 2018-07-06 NOTE — ED NOTES
Pt is currently resting in his room with his eyes closed  No signs of distress at this time  Will continue to monitor        Breana Myers  07/06/18 0117

## 2018-07-06 NOTE — ED NOTES
Pt left with [facility]  Personal belongings handed to [address] [place] [address]       Breana Myers  07/06/18 3084

## 2018-07-06 NOTE — ED NOTES
Insurance Authorization:   Phone call placed to [facility]  Phone number: [phone]  Spoke to [place]  Level of care: 201  Pt has Medicare as primary so no pre-cert is needed  COB completed with [facility]

## 2018-07-06 NOTE — ED NOTES
Both pt and Dr Alina Ruano signed the 12 document, and CW faxed the 12 to Enrique Bobby at [facility], who stated he has a bed available and will hold it for pt

## 2018-07-06 NOTE — ED NOTES
Patient is accepted at [facility]  Patient is accepted by Dr Jenifer Márquez per Kerry Fernández  Transportation is arranged with [facility]  Transportation is scheduled for 2:30 AM            Nurse report is to be called to [phone] prior to patient transfer
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
## 2018-07-06 NOTE — ED NOTES
Pt has Medicare as primary insurance so no pre-cert is needed  A COB will be completed with [address] [place] [address] when a bed is secured

## 2018-07-06 NOTE — ED NOTES
CW spoke with Janene Smith who stated that the hold bed may be used for another pt who is uninsured  CW spoke with Fabienne of [address] [place] [address] who asked CW to fax pt's chart to her for review  CW did so

## 2018-07-06 NOTE — ED NOTES
Fabienne of [address] [place] [address] called CW to say that they do not accept pts on Methadone

## 2018-07-06 NOTE — ED NOTES
Pt appears to be resting  No signs of distress at this time  Will continue to monitor        Barba Margoth  07/05/18 2363

## 2018-07-06 NOTE — ED NOTES
Pt is currently laying in bed and watching television  No signs of distress at this time  Will continue to monitor        Mike Jenkins  07/05/18 2020

## 2018-07-06 NOTE — ED NOTES
Patient is accepted at [address]   Patient is accepted by Dr Roberts Grade* per [address] is arranged with [employer] is scheduled for 0230          Nurse report is to be called to [phone]* prior to patient transfer

## 2018-07-06 NOTE — ED NOTES
Pt is laying in bed and watching television  No signs of distress at this time  Will continue to monitor        Darshan Kehinde  07/05/18 2128
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
## 2018-07-06 NOTE — ED NOTES
Report given from [facility]  Pt is sitting in his room watching tv  No signs of distress at this time  Will continue to monitor        Breana Myers  07/05/18 7001
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
## 2018-07-06 NOTE — PROGRESS NOTES
[facility] ([phone]) contacted for confirmation of methadone dosage  Spoke with Nadia Bernal  Received verbal confirmation that pt is currently being dosed with 100 mg of methadone  Was last dosed 7/4/2018  (missed 7/5/2018)  Dr Shashi Meadows made aware  KATHY faxed to [facility] ([phone]) requesting written verification  Awaiting fax

## 2018-07-07 PROCEDURE — 99232 SBSQ HOSP IP/OBS MODERATE 35: CPT | Performed by: PSYCHIATRY & NEUROLOGY

## 2018-07-07 RX ADMIN — RISPERIDONE 1 MG: 1 TABLET, ORALLY DISINTEGRATING ORAL at 09:01

## 2018-07-07 RX ADMIN — LITHIUM CARBONATE 900 MG: 300 CAPSULE, GELATIN COATED ORAL at 17:23

## 2018-07-07 RX ADMIN — RISPERIDONE 2 MG: 2 TABLET, ORALLY DISINTEGRATING ORAL at 17:23

## 2018-07-07 RX ADMIN — METHADONE HYDROCHLORIDE 100 MG: 10 TABLET ORAL at 09:02

## 2018-07-07 RX ADMIN — NICOTINE 1 PATCH: 21 PATCH, EXTENDED RELEASE TRANSDERMAL at 09:02

## 2018-07-07 RX ADMIN — PRAZOSIN HYDROCHLORIDE 1 MG: 1 CAPSULE ORAL at 21:50

## 2018-07-07 NOTE — PROGRESS NOTES
Progress Note - Behavioral Health   Mariah Hickey 28 y o  male MRN: [id number]  [facility] Encounter: [id number]    Assessment/Plan   Principal Problem:    Schizoaffective disorder, bipolar type ([address] )  Active Problems:    Methadone dependence (HCC)    PTSD (post-traumatic stress disorder)    Cocaine abuse    Methamphetamine abuse      Subjective: Patient approaches MD in rojas to request double portions at lunch only and states he is getting used to being on his medications again and tells how he recently dropped from 137mg of MMT to 100mg  Friendly with peers, out on milieu and reports improved mood       Current Medications:    Current Facility-Administered Medications:  acetaminophen 650 mg Oral Q6H PRN Cira Fleischer, MD   aluminum-magnesium hydroxide-simethicone 30 mL Oral Q4H PRN Cira Fleischer, MD   benztropine 1 mg Intramuscular Q6H PRN Cira Fleischer, MD   benztropine 1 mg Oral Q6H PRN Cira Fleischer, MD   haloperidol 5 mg Oral Q6H PRN Cira Fleischer, MD   haloperidol lactate 5 mg Intramuscular Q6H PRN Cira Fleischer, MD   hydrOXYzine HCL 50 mg Oral Q8H PRN Keven Dawn   ibuprofen 600 mg Oral Q8H PRN Cira Fleischer, MD   lithium 900 mg Oral After Dinner Keven Dawn   magnesium hydroxide 30 mL Oral Daily PRN Cira Fleischer, MD   methadone 100 mg Oral Daily Mehdi Shelley PA-C   nicotine 1 patch Transdermal Daily Cira Fleischer, MD   prazosin 1 mg Oral HS Keven Dawn   risperiDONE 1 mg Oral Daily Keven Dawn   risperiDONE 2 mg Oral After Dinner Eugenia Bermeo   traZODone 50 mg Oral HS PRN Cira Fleischer, MD       Behavioral Health Medications:   current meds:   Current Facility-Administered Medications   Medication Dose Route Frequency    acetaminophen (TYLENOL) tablet 650 mg  650 mg Oral Q6H PRN    aluminum-magnesium hydroxide-simethicone (MYLANTA) 200-200-20 mg/5 mL oral suspension 30 mL  30 mL Oral Q4H PRN    benztropine (COGENTIN) injection 1 mg  1 mg Intramuscular Q6H PRN    benztropine (COGENTIN) tablet 1 mg  1 mg Oral Q6H PRN    haloperidol (HALDOL) tablet 5 mg  5 mg Oral Q6H PRN    haloperidol lactate (HALDOL) injection 5 mg  5 mg Intramuscular Q6H PRN    hydrOXYzine HCL (ATARAX) tablet 50 mg  50 mg Oral Q8H PRN    ibuprofen (MOTRIN) tablet 600 mg  600 mg Oral Q8H PRN    lithium carbonate capsule 900 mg  900 mg Oral After Dinner    magnesium hydroxide (MILK OF MAGNESIA) 400 mg/5 mL oral suspension 30 mL  30 mL Oral Daily PRN    methadone (DOLOPHINE) tablet 100 mg  100 mg Oral Daily    nicotine (NICODERM CQ) 21 mg/24 hr TD 24 hr patch 1 patch  1 patch Transdermal Daily    prazosin (MINIPRESS) capsule 1 mg  1 mg Oral HS    risperiDONE (RisperDAL M-TABS) dispersible tablet 1 mg  1 mg Oral Daily    risperiDONE (RisperDAL M-TABS) dispersible tablet 2 mg  2 mg Oral After Dinner    traZODone (DESYREL) tablet 50 mg  50 mg Oral HS PRN     Vital signs in last 24 hours:  Temp:  [97 °F (36 1 °C)-98 2 °F (36 8 °C)] 98 2 °F (36 8 °C)  HR:  [70-75] 70  Resp:  [16-20] 20  BP: (101-116)/(56-72) 116/56    Laboratory results:    I have personally reviewed all pertinent laboratory/tests results    Most Recent Labs:   Lab Results   Component Value Date    WBC 10 43 (H) 07/05/2018    RBC 4 38 07/05/2018    HGB 12 7 07/05/2018    HCT 38 9 07/05/2018     07/05/2018    RDW 12 7 07/05/2018    NEUTROABS 6 82 07/05/2018     07/05/2018    K 4 3 07/05/2018     07/05/2018    CO2 25 07/05/2018    BUN 12 07/05/2018    CREATININE 0 87 07/05/2018    GLUCOSE 103 07/05/2018    CALCIUM 8 6 07/05/2018    AST 42 06/29/2018    ALT 44 06/29/2018    ALKPHOS 88 06/29/2018    PROT 7 2 06/29/2018    BILITOT 0 40 06/29/2018    CHOL 184 06/26/2018    HDL 60 06/26/2018    TRIG 86 06/26/2018    LDLCALC 107 (H) 06/26/2018    LITHIUM 0 5 06/29/2018    [id number] 1 107 06/26/2018       Psychiatric Review of Systems:  Behavior over the last 24 hours:  improved  Sleep: normal  Appetite: increased  Medication side effects: No  ROS: sedation    Mental Status Evaluation:  Appearance:  casually dressed and tattooed   Behavior:  cooperative   Speech:  normal pitch and normal volume   Mood:  normal   Affect:  normal and anxious   Language naming objects   Thought Process:  circumstantial   Thought Content:  normal   Perceptual Disturbances: Auditory hallucinations without commands   Risk Potential: Suicidal Ideations without plan and Homicidal Ideations none   Sensorium:  person, place and situation   Cognition:  grossly intact   Consciousness:  alert and awake    Attention: attention span appeared shorter than expected for age   Insight:  limited   Judgment: limited   Intellect    Gait/Station: normal gait/station   Motor Activity: no abnormal movements     Memory: Short and long term memory  Fair     Progress Toward Goals: progressing    Recommended Treatment:   Check lithium level, CMP Monday morning  Continue with group therapy, milieu therapy and occupational therapy      Continue following current medications:   Current Facility-Administered Medications:  acetaminophen 650 mg Oral Q6H PRN Maria Prado MD   aluminum-magnesium hydroxide-simethicone 30 mL Oral Q4H PRN Maria Prado MD   benztropine 1 mg Intramuscular Q6H PRN Maria Prado MD   benztropine 1 mg Oral Q6H PRN Maria Prado MD   haloperidol 5 mg Oral Q6H PRN Maria Prado MD   haloperidol lactate 5 mg Intramuscular Q6H PRN Maria Prado MD   hydrOXYzine HCL 50 mg Oral Q8H PRN Keven Dawn   ibuprofen 600 mg Oral Q8H PRN Maria Prado MD   lithium 900 mg Oral After Dinner Keven Dawn   magnesium hydroxide 30 mL Oral Daily PRN Maria Prado MD   methadone 100 mg Oral Daily Lloyd Negro PA-C   nicotine 1 patch Transdermal Daily Maria Prado MD   prazosin 1 mg Oral HS Keven Dawn   risperiDONE 1 mg Oral Daily Keven Dawn   risperiDONE 2 mg Oral After Dinner Ritu Ojeda   traZODone 50 mg Oral HS PRN Maria Prado MD       Risks, benefits and possible side effects of Medications:   Risks, benefits, and possible side effects of medications explained to patient and patient verbalizes understanding  This note has been constructed using a voice recognition system  There may be translation, syntax,  or grammatical errors  If you have any questions, please contact the dictating provider

## 2018-07-07 NOTE — PROGRESS NOTES
Pt appears agitated on phone at times  Socializing in day room  Denies current SI  Denies having any concerns at present time
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
## 2018-07-07 NOTE — PROGRESS NOTES
Pt pleasant during interaction  Has been on and off phone for most of the shift  No irritability noted  Denies SI, and depression  Anxiety has been mild  Pt has been visible in milieu and social with peers  Will continue to monitor

## 2018-07-08 PROCEDURE — 99232 SBSQ HOSP IP/OBS MODERATE 35: CPT | Performed by: PSYCHIATRY & NEUROLOGY

## 2018-07-08 RX ADMIN — LITHIUM CARBONATE 900 MG: 300 CAPSULE, GELATIN COATED ORAL at 17:38

## 2018-07-08 RX ADMIN — NICOTINE 1 PATCH: 21 PATCH, EXTENDED RELEASE TRANSDERMAL at 08:57

## 2018-07-08 RX ADMIN — METHADONE HYDROCHLORIDE 100 MG: 10 TABLET ORAL at 08:59

## 2018-07-08 RX ADMIN — RISPERIDONE 2 MG: 2 TABLET, ORALLY DISINTEGRATING ORAL at 17:38

## 2018-07-08 RX ADMIN — PRAZOSIN HYDROCHLORIDE 1 MG: 1 CAPSULE ORAL at 22:32

## 2018-07-08 RX ADMIN — RISPERIDONE 1 MG: 1 TABLET, ORALLY DISINTEGRATING ORAL at 08:56

## 2018-07-08 NOTE — PROGRESS NOTES
Pleasant during interaction  Napping during evening  Denies SI  Discussed medications  No questions or concerns at this time  Will continue to monitor

## 2018-07-08 NOTE — PROGRESS NOTES
Pt pleasant during interaction  Napping throughout morning  Denies SI, depression, and anxiety  Denies any concerns

## 2018-07-08 NOTE — PROGRESS NOTES
Progress Note - Behavioral Health   Lynn Fung 28 y o  male MRN: [id number]  Unit/Bed#: [facility] 253-01 Encounter: [id number]    Assessment/Plan   Principal Problem:    Schizoaffective disorder, bipolar type ([facility] )  Active Problems:    Methadone dependence ([facility] )    PTSD (post-traumatic stress disorder)    Cocaine abuse    Methamphetamine abuse      Subjective:  Nothing is wrong on just catching up on some sleep did not sleep a lot for a came in here  Patient seen examined in room per nursing report no issues overnight and his double portions at lunch should begin today  He is resting in bed easily arousable denies any problems with his medications states that he slept overnight and that he would just once a day to get caught up on sleep denies any intrusive thoughts, hallucination or suicidal ideation  Current Medications:    Current Facility-Administered Medications:  acetaminophen 650 mg Oral Q6H PRN Emerita Brar MD   aluminum-magnesium hydroxide-simethicone 30 mL Oral Q4H PRN Emerita Brar MD   benztropine 1 mg Intramuscular Q6H PRN Emerita Brar MD   benztropine 1 mg Oral Q6H PRN Emerita Brar MD   haloperidol 5 mg Oral Q6H PRN Emerita Brar MD   haloperidol lactate 5 mg Intramuscular Q6H PRN Emerita Brar MD   hydrOXYzine HCL 50 mg Oral Q8H PRN Keven Dawn   ibuprofen 600 mg Oral Q8H PRN Emerita Brar MD   lithium 900 mg Oral After Dinner Keven Dawn   magnesium hydroxide 30 mL Oral Daily PRN Emerita Brar MD   methadone 100 mg Oral Daily Tete Bacon PA-C   nicotine 1 patch Transdermal Daily Emerita Brar MD   prazosin 1 mg Oral HS Keven Dawn   risperiDONE 1 mg Oral Daily Keven Dawn   risperiDONE 2 mg Oral After Dinner Mika Rico   traZODone 50 mg Oral HS PRN Emerita Brar MD       Behavioral Health Medications: all current active meds have been reviewed      Vital signs in last 24 hours:  Temp:  [96 9 °F (36 1 °C)-98 2 °F (36 8 °C)] 97 9 °F (36 6 °C)  HR:  [51-70] 70  Resp: [16-20] 16  BP: (105-116)/(51-64) 107/63    Laboratory results:    I have personally reviewed all pertinent laboratory/tests results  Most Recent Labs:   Lab Results   Component Value Date    WBC 10 43 (H) 07/05/2018    RBC 4 38 07/05/2018    HGB 12 7 07/05/2018    HCT 38 9 07/05/2018     07/05/2018    RDW 12 7 07/05/2018    NEUTROABS 6 82 07/05/2018     07/05/2018    K 4 3 07/05/2018     07/05/2018    CO2 25 07/05/2018    BUN 12 07/05/2018    CREATININE 0 87 07/05/2018    GLUCOSE 103 07/05/2018    CALCIUM 8 6 07/05/2018    AST 42 06/29/2018    ALT 44 06/29/2018    ALKPHOS 88 06/29/2018    PROT 7 2 06/29/2018    BILITOT 0 40 06/29/2018    CHOL 184 06/26/2018    HDL 60 06/26/2018    TRIG 86 06/26/2018    LDLCALC 107 (H) 06/26/2018    LITHIUM 0 5 06/29/2018    [id number] 1 107 06/26/2018       Psychiatric Review of Systems:  Behavior over the last 24 hours:  unchanged  Sleep: hypersomnia  Appetite: increased  Medication side effects: No  ROS: sedation    Mental Status Evaluation:  Appearance:  thin, with tattoos   Behavior:  Cooperative but brief   Speech:  Scan and fluent   Mood:  normal   Affect:  normal   Language sparse   Thought Process:  normal   Thought Content:  normal   Perceptual Disturbances: Denies   Risk Potential: Suicidal Ideations none and Homicidal Ideations none   Sensorium:  person, place and situation   Cognition:  grossly intact   Consciousness:  sedated    Attention: attention span appeared shorter than expected for age   Insight:  fair   Judgment: fair   Intellect    Gait/Station: In bed   Motor Activity: no abnormal movements     Memory: Short and long term memory   fairand long term memory     Progress Toward Goals:   Slow progress    Recommended Treatment:   Check lithium level tomorrow morning  Continue with group therapy, milieu therapy and occupational therapy      Continue following current medications:   Current Facility-Administered Medications:  acetaminophen 650 mg Oral Q6H PRN Carol Johnson MD   aluminum-magnesium hydroxide-simethicone 30 mL Oral Q4H PRN Carol Johnson MD   benztropine 1 mg Intramuscular Q6H PRN Carol Johnson MD   benztropine 1 mg Oral Q6H PRN Carol Johnson MD   haloperidol 5 mg Oral Q6H PRN Carol Johnson MD   haloperidol lactate 5 mg Intramuscular Q6H PRN Carol Johnson MD   hydrOXYzine HCL 50 mg Oral Q8H PRN Keven Dawn   ibuprofen 600 mg Oral Q8H PRN Carol Johnson MD   lithium 900 mg Oral After Dinner [facility]   magnesium hydroxide 30 mL Oral Daily PRN Carol Johnson MD   methadone 100 mg Oral Daily Viona OLIMPIA Chinchilla   nicotine 1 patch Transdermal Daily Carol Johnson MD   prazosin 1 mg Oral HS [facility]   risperiDONE 1 mg Oral Daily [facility]   risperiDONE 2 mg Oral After Dinner Ana Valera   traZODone 50 mg Oral HS PRN Carol Johnson MD       Risks, benefits and possible side effects of Medications:   Risks, benefits, and possible side effects of medications explained to patient and patient verbalizes understanding  This note has been constructed using a voice recognition system  There may be translation, syntax,  or grammatical errors  If you have any questions, please contact the dictating provider

## 2018-07-09 LAB
ALBUMIN SERPL BCP-MCNC: 3.3 G/DL (ref 3.5–5)
ALP SERPL-CCNC: 67 U/L (ref 46–116)
ALT SERPL W P-5'-P-CCNC: 20 U/L (ref 12–78)
ANION GAP SERPL CALCULATED.3IONS-SCNC: 2 MMOL/L (ref 4–13)
AST SERPL W P-5'-P-CCNC: 15 U/L (ref 5–45)
BILIRUB SERPL-MCNC: 0.4 MG/DL (ref 0.2–1)
BUN SERPL-MCNC: 11 MG/DL (ref 5–25)
CALCIUM SERPL-MCNC: 8.2 MG/DL (ref 8.3–10.1)
CHLORIDE SERPL-SCNC: 105 MMOL/L (ref 100–108)
CO2 SERPL-SCNC: 34 MMOL/L (ref 21–32)
CREAT SERPL-MCNC: 0.88 MG/DL (ref 0.6–1.3)
GFR SERPL CREATININE-BSD FRML MDRD: 112 ML/MIN/1.73SQ M
GLUCOSE P FAST SERPL-MCNC: 91 MG/DL (ref 65–99)
GLUCOSE SERPL-MCNC: 91 MG/DL (ref 65–140)
LITHIUM SERPL-SCNC: 0.9 MMOL/L (ref 0.5–1)
POTASSIUM SERPL-SCNC: 4.4 MMOL/L (ref 3.5–5.3)
PROT SERPL-MCNC: 6.6 G/DL (ref 6.4–8.2)
SODIUM SERPL-SCNC: 141 MMOL/L (ref 136–145)

## 2018-07-09 PROCEDURE — 80053 COMPREHEN METABOLIC PANEL: CPT | Performed by: PSYCHIATRY & NEUROLOGY

## 2018-07-09 PROCEDURE — 80178 ASSAY OF LITHIUM: CPT | Performed by: PSYCHIATRY & NEUROLOGY

## 2018-07-09 PROCEDURE — 99232 SBSQ HOSP IP/OBS MODERATE 35: CPT | Performed by: PSYCHIATRY & NEUROLOGY

## 2018-07-09 PROCEDURE — 86592 SYPHILIS TEST NON-TREP QUAL: CPT | Performed by: PSYCHIATRY & NEUROLOGY

## 2018-07-09 RX ADMIN — RISPERIDONE 1 MG: 1 TABLET, ORALLY DISINTEGRATING ORAL at 09:07

## 2018-07-09 RX ADMIN — NICOTINE 1 PATCH: 21 PATCH, EXTENDED RELEASE TRANSDERMAL at 09:06

## 2018-07-09 RX ADMIN — METHADONE HYDROCHLORIDE 100 MG: 10 TABLET ORAL at 09:06

## 2018-07-09 RX ADMIN — RISPERIDONE 2 MG: 2 TABLET, ORALLY DISINTEGRATING ORAL at 17:21

## 2018-07-09 RX ADMIN — LITHIUM CARBONATE 900 MG: 300 CAPSULE, GELATIN COATED ORAL at 17:09

## 2018-07-09 RX ADMIN — PRAZOSIN HYDROCHLORIDE 1 MG: 1 CAPSULE ORAL at 21:32

## 2018-07-09 NOTE — PROGRESS NOTES
Pt denies all psychiatric symptoms and pain at this point of time  Pt reports concern about his feet having calluses and would like to see the dr  For this  Advised pt to speak with physician tomorrow morning and pt verbalized understanding and agreement  Pt is visual in the milieu; socializes with select peers and interactions are appropriate  Pt states his goal for the rest of the day is to speak with his wife over the phone and go to sleep early  Continue to monitor

## 2018-07-09 NOTE — PROGRESS NOTES
Pt became agitated this evening due to snack options that were offered to patients  Pt upset stating he wanted a sandwich  Extra sandwiches were available so was offered to pt however pt refused and began yelling stating he no longer wanted any snack  Pt yelling and pacing halls  States "I'm about to flip, how can you offer patients crackers and cheese as a snack!" pt then got onto the phone and began yelling again stating he was upset about snack and "someone controlling the TV and I dont want to watch the show he wants to!" pt was verbally de-escalated  Reviewed appropriate behaviors expected on unit  Discussed healthy coping skills to use to help calm self  Pt declined PRN stating he will walk the halls until feeling calm  Pt remained with irritable edge however able to remain appropriate for the remainder of the evening

## 2018-07-09 NOTE — PROGRESS NOTES
Pt rates anxiety as a 2 and depression as a 5  He denies S/H/I or AVH  He is compliant with medications and attends groups  He stated that he stopped taking his meds after last discharge because he didn't have the copay for them  He stated that parents will help with the cost this time  Continue to monitor, provide support and encouragement

## 2018-07-09 NOTE — DISCHARGE INSTR - OTHER ORDERS
24/7 Lilliana Donovan [facility]  [phone]    [employer] Toll Free: [phone]

## 2018-07-09 NOTE — PROGRESS NOTES
Progress Note - Behavioral Health   Merna Willson 28 y o  male MRN: [id number]  Unit/Bed#: [facility] 253-01 Encounter: [id number]    Assessment/Plan   Principal Problem:    Schizoaffective disorder, bipolar type ([facility] 75 )  Active Problems:    Methadone dependence ([facility] 75 )    PTSD (post-traumatic stress disorder)    Cocaine abuse    Methamphetamine abuse    Subjective:  Patient is compliant with medication with no acute side effects  Patient's lithium level was increased over the weekend and his lithium is 0 9  No signs of lithium toxicity  Patient does report improvement in mood, anger and anxiety  Patient has signed a 72 hr notice on the weekend but is agreeable to finish full 72 hr observation for safety assessment  He is consenting for safety on the unit  Current Medications:    Current Facility-Administered Medications:  acetaminophen 650 mg Oral Q6H PRN Elida Halsted, MD   aluminum-magnesium hydroxide-simethicone 30 mL Oral Q4H PRN Elida Halsted, MD   benztropine 1 mg Intramuscular Q6H PRN Elida Halsted, MD   benztropine 1 mg Oral Q6H PRN Elida Halsted, MD   haloperidol 5 mg Oral Q6H PRN Elida Halsted, MD   haloperidol lactate 5 mg Intramuscular Q6H PRN Elida Halsted, MD   hydrOXYzine HCL 50 mg Oral Q8H PRN Keven Dawn   ibuprofen 600 mg Oral Q8H PRN Elida Halsted, MD   lithium 900 mg Oral After Dinner Keven Dawn   magnesium hydroxide 30 mL Oral Daily PRN Shelle Halsted, MD   methadone 100 mg Oral Daily Rian Allen PA-C   nicotine 1 patch Transdermal Daily Shelle Halsted, MD   prazosin 1 mg Oral HS Keven Dawn   risperiDONE 1 mg Oral Daily Keven Dawn   risperiDONE 2 mg Oral After Dinner Jocelyn Srinivasan   traZODone 50 mg Oral HS PRN Shelle Halsted, MD       Behavioral Health Medications: all current active meds have been reviewed      Vital signs in last 24 hours:  Temp:  [97 2 °F (36 2 °C)-97 7 °F (36 5 °C)] 97 2 °F (36 2 °C)  HR:  [59-63] 63  Resp:  [17-18] 17  BP: (109-117)/(57-65) 109/65    Laboratory results:    I have personally reviewed all pertinent laboratory/tests results  Labs in last 72 hours:   Recent Labs      07/09/18   0655   NA  141   K  4 4   CL  105   CO2  34*   BUN  11   CREATININE  0 88   GLUCOSE  91   CALCIUM  8 2*   AST  15   ALT  20   ALKPHOS  67   PROT  6 6   BILITOT  0 40   LITHIUM  0 9     Admission Labs:   Admission on 07/06/2018   Component Date Value    Lithium Lvl 07/09/2018 0 9     Sodium 07/09/2018 141     Potassium 07/09/2018 4 4     Chloride 07/09/2018 105     CO2 07/09/2018 34*    Anion Gap 07/09/2018 2*    BUN 07/09/2018 11     Creatinine 07/09/2018 0 88     Glucose 07/09/2018 91     Glucose, Fasting 07/09/2018 91     Calcium 07/09/2018 8 2*    AST 07/09/2018 15     ALT 07/09/2018 20     Alkaline Phosphatase 07/09/2018 67     Total Protein 07/09/2018 6 6     Albumin 07/09/2018 3 3*    Total Bilirubin 07/09/2018 0 40     eGFR 07/09/2018 112        Psychiatric Review of Systems:  Behavior over the last 24 hours:  improving  Sleep: normal  Appetite: normal  Medication side effects: No  ROS: no complaints    Mental Status Evaluation:  Appearance:  casually dressed   Behavior:  guarded   Speech:  soft   Mood:  anxious and depressed   Affect:  constricted   Language naming objects and repeating phrases   Thought Process:  circumstantial   Thought Content:  obsessions   Perceptual Disturbances: None   Risk Potential: Suicidal Ideations without plan, Homicidal Ideations none and Potential for Aggression No   Sensorium:  person and place   Cognition:  grossly intact   Consciousness:  awake    Attention: attention span appeared shorter than expected for age   Insight:  limited   Judgment: limited   Intellect fair   Gait/Station: normal gait/station   Motor Activity: no abnormal movements     Memory: Short and long term memory  fair     Progress Toward Goals: progressing    Recommended Treatment:   Continue with group therapy, milieu therapy and occupational therapy  Continue following current medications:   Current Facility-Administered Medications:  acetaminophen 650 mg Oral Q6H PRN Leonor Hernandez MD   aluminum-magnesium hydroxide-simethicone 30 mL Oral Q4H PRN Leonor Hernandez MD   benztropine 1 mg Intramuscular Q6H PRN Leonor Hernandez MD   benztropine 1 mg Oral Q6H PRN Leonor Hernandez MD   haloperidol 5 mg Oral Q6H PRN Leonor Hernandez MD   haloperidol lactate 5 mg Intramuscular Q6H PRN Leonor Hernandez MD   hydrOXYzine HCL 50 mg Oral Q8H PRN [facility]   ibuprofen 600 mg Oral Q8H PRN Leonor Hernandez MD   lithium 900 mg Oral After Dinner [facility]   magnesium hydroxide 30 mL Oral Daily PRN Leonor Hernandez MD   methadone 100 mg Oral Daily Lamine Hudson PA-C   nicotine 1 patch Transdermal Daily Leonor Hernandez MD   prazosin 1 mg Oral HS [facility]   risperiDONE 1 mg Oral Daily [facility]   risperiDONE 2 mg Oral After Dinner Mitzi Henriquez   traZODone 50 mg Oral HS PRN Leonor Hernandez MD       Risks, benefits and possible side effects of Medications:   Risks, benefits, and possible side effects of medications explained to patient and patient verbalizes understanding  This note has been constructed using a voice recognition system  There may be translation, syntax,  or grammatical errors  If you have any questions, please contact the dictating provider

## 2018-07-09 NOTE — CASE MANAGEMENT
MARTITA spoke with pt's mother to inform her of pt's dc for Wednesday  Mother is concerned that pt will return to girlfriends house and will end up back in hospital  MARTITA arranged outpatient psychiatric appointment with Dr Sterling Gould for 7/12/18 at 2pm  Pt is able to return to Methadone Maintenance at [facility] also arranged Khoi Stoner to pick pt up at time of dc  Per mother, pt's girlfriend wanted to be admitted to [facility] and drove to [address] [place] [address] ED to try and get admitted  Pt's girlfriend was accepted to a hospital in [place]  At this time pt's car is in the ED but girlfriend has the keys  Pt is not concerned about the car and will have to wait for girlfriend to get of hospital before he gets his car back  Pt's mother will help pt get to appointments until pt can get his car back

## 2018-07-10 LAB — RPR SER QL: NORMAL

## 2018-07-10 PROCEDURE — 99232 SBSQ HOSP IP/OBS MODERATE 35: CPT | Performed by: PHYSICIAN ASSISTANT

## 2018-07-10 RX ADMIN — METHADONE HYDROCHLORIDE 100 MG: 10 TABLET ORAL at 09:09

## 2018-07-10 RX ADMIN — RISPERIDONE 1 MG: 1 TABLET, ORALLY DISINTEGRATING ORAL at 09:09

## 2018-07-10 RX ADMIN — PRAZOSIN HYDROCHLORIDE 1 MG: 1 CAPSULE ORAL at 21:40

## 2018-07-10 RX ADMIN — LITHIUM CARBONATE 900 MG: 300 CAPSULE, GELATIN COATED ORAL at 17:17

## 2018-07-10 RX ADMIN — TRAZODONE HYDROCHLORIDE 50 MG: 50 TABLET ORAL at 21:39

## 2018-07-10 RX ADMIN — NICOTINE 1 PATCH: 21 PATCH, EXTENDED RELEASE TRANSDERMAL at 09:10

## 2018-07-10 RX ADMIN — RISPERIDONE 2 MG: 2 TABLET, ORALLY DISINTEGRATING ORAL at 17:17

## 2018-07-10 NOTE — PROGRESS NOTES
Pt denies having any concerns, expresses looking forward to discharge tomorrow  Understands need to continue taking medications following discharge

## 2018-07-10 NOTE — PROGRESS NOTES
Pt visible in milieu and attending groups  Denies SI and reports improved depression and anxiety  Pt anticipates discharge tomorrow and feels ready  Discussed importance of continuing medications at discharge  Pt also said he plans to live with his parents at discharge which is a better environment than with significant other  No questions/concerns at this time

## 2018-07-10 NOTE — PROGRESS NOTES
Progress Note - Behavioral Health   Giovana Waller 28 y o  male MRN: [id number]  Unit/Bed#: [facility] 253-01 Encounter: [id number]    Assessment/Plan   Principal Problem:    Schizoaffective disorder, bipolar type ([facility] 75 )  Active Problems:    Methadone dependence ([facility] 75 )    PTSD (post-traumatic stress disorder)    Cocaine abuse    Methamphetamine abuse      Subjective:  Patient today focused on discharge  Signed 72 hour notice that ends tomorrow  Reports mood more stable and denied suicidal ideation  Depression rated as less with more future thinking and improved self esteem  Not requiring PRN medications  Anxiety rated as under control  Lithium level   9  Only complaint is xerostomia and is drinking more water  Does show manic symptoms of circumstantial thought process and slightly pressured speech, but at this time does not endorse criteria for galindo  Denied psychotic symptoms  Medication compliant  No other side effects besides slight sedation mentioned  Tentative discharge tomorrow  Not interested in rehab and will be returning to [facility] house      Current Medications:  Current Facility-Administered Medications   Medication Dose Route Frequency    acetaminophen (TYLENOL) tablet 650 mg  650 mg Oral Q6H PRN    aluminum-magnesium hydroxide-simethicone (MYLANTA) 200-200-20 mg/5 mL oral suspension 30 mL  30 mL Oral Q4H PRN    benztropine (COGENTIN) injection 1 mg  1 mg Intramuscular Q6H PRN    benztropine (COGENTIN) tablet 1 mg  1 mg Oral Q6H PRN    haloperidol (HALDOL) tablet 5 mg  5 mg Oral Q6H PRN    haloperidol lactate (HALDOL) injection 5 mg  5 mg Intramuscular Q6H PRN    hydrOXYzine HCL (ATARAX) tablet 50 mg  50 mg Oral Q8H PRN    ibuprofen (MOTRIN) tablet 600 mg  600 mg Oral Q8H PRN    lithium carbonate capsule 900 mg  900 mg Oral After Dinner    magnesium hydroxide (MILK OF MAGNESIA) 400 mg/5 mL oral suspension 30 mL  30 mL Oral Daily PRN    methadone (DOLOPHINE) tablet 100 mg  100 mg Oral Daily  nicotine (NICODERM CQ) 21 mg/24 hr TD 24 hr patch 1 patch  1 patch Transdermal Daily    prazosin (MINIPRESS) capsule 1 mg  1 mg Oral HS    risperiDONE (RisperDAL M-TABS) dispersible tablet 1 mg  1 mg Oral Daily    risperiDONE (RisperDAL M-TABS) dispersible tablet 2 mg  2 mg Oral After Dinner    traZODone (DESYREL) tablet 50 mg  50 mg Oral HS PRN       Behavioral Health Medications: all current active meds have been reviewed and continue current psychiatric medications  Vitals:  Vitals:    07/10/18 0906   BP:    Pulse: 77   Resp:    Temp:        Laboratory results:    I have personally reviewed all pertinent laboratory/tests results  Most Recent Labs:   Lab Results   Component Value Date    WBC 10 43 (H) 07/05/2018    RBC 4 38 07/05/2018    HGB 12 7 07/05/2018    HCT 38 9 07/05/2018     07/05/2018    RDW 12 7 07/05/2018    NEUTROABS 6 82 07/05/2018     07/09/2018    K 4 4 07/09/2018     07/09/2018    CO2 34 (H) 07/09/2018    BUN 11 07/09/2018    CREATININE 0 88 07/09/2018    GLUCOSE 91 07/09/2018    CALCIUM 8 2 (L) 07/09/2018    AST 15 07/09/2018    ALT 20 07/09/2018    ALKPHOS 67 07/09/2018    PROT 6 6 07/09/2018    BILITOT 0 40 07/09/2018    CHOL 184 06/26/2018    HDL 60 06/26/2018    TRIG 86 06/26/2018    LDLCALC 107 (H) 06/26/2018    LITHIUM 0 9 07/09/2018    NBL1AZEGGNUV 1 107 06/26/2018    RPR Non-Reactive 07/09/2018       Psychiatric Review of Systems:  Behavior over the last 24 hours:  improved  Sleep: normal  Appetite: normal  Medication side effects: Yes, xerostomia  ROS: no complaints    Mental Status Evaluation:  Appearance:  casually dressed   Behavior:  less guarded   Speech:  slightly pressured   Mood:  less depressed and anxious   Affect:  mood-congruent   Language appropriate   Thought Process:  circumstantial   Thought Content:  obsessions   Perceptual Disturbances: None   Risk Potential: Denied SI/HI   Potential for aggression: No   Sensorium:  person, place and time/date   Cognition:  grossly intact   Consciousness:  alert and awake    Recent and Remote Memory intact   Attention: attention span appeared shorter than expected for age   Insight:  partial   Judgment: improved   Gait/Station: normal gait/station and normal balance   Motor Activity: no abnormal movements     Progress Toward Goals: progressing    Recommended Treatment: Continue with group therapy, milieu therapy and occupational therapy  1   Continue current medications  2  Tentative discharge tomorrow    Risks, benefits and possible side effects of Medications:   Risks, benefits, and possible side effects of medications explained to patient and patient verbalizes understanding        Joel Pink PA-C

## 2018-07-11 VITALS
RESPIRATION RATE: 17 BRPM | TEMPERATURE: 97.1 F | BODY MASS INDEX: 18.9 KG/M2 | SYSTOLIC BLOOD PRESSURE: 130 MMHG | DIASTOLIC BLOOD PRESSURE: 76 MMHG | HEART RATE: 75 BPM | WEIGHT: 135 LBS | HEIGHT: 71 IN

## 2018-07-11 PROBLEM — Z91.14 NONCOMPLIANCE WITH MEDICATIONS: Chronic | Status: ACTIVE | Noted: 2018-07-11

## 2018-07-11 PROCEDURE — 99239 HOSP IP/OBS DSCHRG MGMT >30: CPT | Performed by: PHYSICIAN ASSISTANT

## 2018-07-11 RX ORDER — METHADONE HYDROCHLORIDE 10 MG/1
100 TABLET ORAL DAILY
Qty: 10 TABLET | Refills: 0
Start: 2018-07-12

## 2018-07-11 RX ORDER — RISPERIDONE 2 MG/1
2 TABLET, FILM COATED ORAL
Qty: 30 TABLET | Refills: 0 | Status: SHIPPED | OUTPATIENT
Start: 2018-07-11

## 2018-07-11 RX ORDER — LITHIUM CARBONATE 300 MG/1
900 CAPSULE ORAL
Qty: 90 CAPSULE | Refills: 0 | Status: SHIPPED | OUTPATIENT
Start: 2018-07-11

## 2018-07-11 RX ORDER — RISPERIDONE 1 MG/1
1 TABLET, FILM COATED ORAL DAILY
Qty: 30 TABLET | Refills: 0 | Status: SHIPPED | OUTPATIENT
Start: 2018-07-11

## 2018-07-11 RX ORDER — PRAZOSIN HYDROCHLORIDE 1 MG/1
1 CAPSULE ORAL
Qty: 30 CAPSULE | Refills: 0 | Status: SHIPPED | OUTPATIENT
Start: 2018-07-11

## 2018-07-11 RX ADMIN — NICOTINE 1 PATCH: 21 PATCH, EXTENDED RELEASE TRANSDERMAL at 09:02

## 2018-07-11 RX ADMIN — RISPERIDONE 1 MG: 1 TABLET, ORALLY DISINTEGRATING ORAL at 09:01

## 2018-07-11 RX ADMIN — METHADONE HYDROCHLORIDE 100 MG: 10 TABLET ORAL at 09:01

## 2018-07-11 NOTE — PROGRESS NOTES
Pt pleasant during interaction  Denies SI and contracts for safety  Reports improved depression and anxiety  Doing well on current medicines  Pt continues to report he will go to live with parents at discharge and plans to break up with significant other today  No questions/concerns at this time
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
## 2018-07-11 NOTE — DISCHARGE INSTRUCTIONS
Schizoaffective Disorder   WHAT YOU NEED TO KNOW:   Schizoaffective disorder is a long-term mental illness that may change how you think, feel, and act around others  You may not know what is real and what is not real    DISCHARGE INSTRUCTIONS:   Medicines:   · Antipsychotics: These medicines help decrease psychotic symptoms or severe agitation  You may need antiparkinson medicine to control muscle stiffness, twitches, and restlessness caused by antipsychotic medicines  · Antianxiety medicine: This medicine may be given to decrease anxiety and help you feel calm and relaxed  · Antidepressants: These medicines are given to decrease or stop the symptoms of depression, anxiety, and behavior problems  · Mood stabilizers: These medicines help control mood swings  · Anticonvulsants: This medicine is given to control seizures  It may also be used to decrease violent behavior and control your mood swings  · Blood pressure medicines: These may be used to help decrease motor tics (uncontrolled movements)  They may also help you feel calmer, more focused, and less irritable  · Anticholinergics: This medicine decreases the side effects of other medicines  · Take your medicine as directed  Contact your healthcare provider if you think your medicine is not helping or if you have side effects  Tell him or her if you are allergic to any medicine  Keep a list of the medicines, vitamins, and herbs you take  Include the amounts, and when and why you take them  Bring the list or the pill bottles to follow-up visits  Carry your medicine list with you in case of an emergency  Follow up with your healthcare provider or psychiatrist as directed: You may need to return to have your blood pressure and other symptoms checked  You may need blood tests to check the level of medicine in your blood  Write down your questions so you remember to ask them during your visits  Manage your symptoms:   The following may help you feel better or prevent symptoms of schizoaffective disorder from coming back:  · Find support for yourself and your family:  Talk with others to help you cope with your illness better  This may also help to improve how you relate to others  · Keep all medical appointments: This will help manage your disease and the side-effects from medicines you may be taking  · Use your medicines as directed:  Put your medicines in a pillbox placed in an area you can easily see  Use a watch with an alarm to help you remember when it is time to take your medicine  Tell your healthcare provider if you know or think you might be pregnant  Do not stop taking your medicines without your healthcare provider's okay  A sudden stop can cause serious medical problems  · Watch for early signs of a relapse and seek help immediately:      ¨ How you think, feel, and see things has changed  ¨ You behave differently than usual     ¨ You become more nervous and upset, but do not know why  ¨ You eat less and have trouble sleeping  ¨ You have little or no interest in friends or activities  For support and more information:   · [employer]  [facility], [facility] [address] , Yasmine Lopez [address]  Phone: 4- [phone]  Phone: [phone]  Web Address: [employer] br  org  · [address] [place] [address], Public Information & Communication Branch  [address] [place] [address], [address] [facility], [address]  [place]ix [place] [address]   Phone: [phone]  Phone: [phone]  Web Address: Madhu garcia  Contact your healthcare provider or psychiatrist if:   · You think you are having a relapse  · You are having side effects from your medicine, or they are not helping  · You are not sleeping well or are sleeping more than usual     · You cannot eat or are eating more than usual     · You have muscle spasms, stiffness, or trouble walking      · Your sad feelings or thoughts change the way you function during the day  · You have questions or concerns about your condition or care  Seek care immediately or call 911 if:   · You feel like hurting or killing yourself or others  · You feel that your condition is getting worse  · You feel very upset, threaten someone, or you feel violent  · You suddenly have changes in your vision  · You suddenly have chest pain, trouble breathing, or a fever  © 2017 [address]  Information is for End User's use only and may not be sold, redistributed or otherwise used for commercial purposes  All illustrations and images included in CareNotes® are the copyrighted property of A D A M , Inc  or [employer] Wang  The above information is an  only  It is not intended as medical advice for individual conditions or treatments  Talk to your doctor, nurse or pharmacist before following any medical regimen to see if it is safe and effective for you

## 2018-07-11 NOTE — PROGRESS NOTES
Requested & received Trazodone 50 mg po prn at 2139  Good effect obtained, as observed asleep in bed within the hr

## 2019-02-19 ENCOUNTER — HOSPITAL ENCOUNTER (EMERGENCY)
Facility: HOSPITAL | Age: 34
Discharge: HOME | End: 2019-02-19
Attending: EMERGENCY MEDICINE | Admitting: EMERGENCY MEDICINE
Payer: MEDICARE

## 2019-02-19 ENCOUNTER — APPOINTMENT (EMERGENCY)
Dept: CT IMAGING | Facility: HOSPITAL | Age: 34
End: 2019-02-19
Payer: MEDICARE

## 2019-02-19 VITALS
OXYGEN SATURATION: 99 % | SYSTOLIC BLOOD PRESSURE: 144 MMHG | BODY MASS INDEX: 20.3 KG/M2 | RESPIRATION RATE: 20 BRPM | HEART RATE: 91 BPM | HEIGHT: 71 IN | DIASTOLIC BLOOD PRESSURE: 92 MMHG | WEIGHT: 145 LBS | TEMPERATURE: 97.5 F

## 2019-02-19 DIAGNOSIS — L03.90 CELLULITIS: Primary | ICD-10-CM

## 2019-02-19 LAB
ANION GAP SERPL CALCULATED.3IONS-SCNC: 9 MMOL/L (ref 4–13)
BASOPHILS # BLD AUTO: 0.12 THOUSANDS/ΜL (ref 0–0.1)
BASOPHILS NFR BLD AUTO: 1 % (ref 0–1)
BUN SERPL-MCNC: 10 MG/DL (ref 5–25)
CALCIUM SERPL-MCNC: 8.7 MG/DL (ref 8.3–10.1)
CHLORIDE SERPL-SCNC: 102 MMOL/L (ref 100–108)
CO2 SERPL-SCNC: 28 MMOL/L (ref 21–32)
CREAT SERPL-MCNC: 0.8 MG/DL (ref 0.6–1.3)
EOSINOPHIL # BLD AUTO: 0.68 THOUSAND/ΜL (ref 0–0.61)
EOSINOPHIL NFR BLD AUTO: 5 % (ref 0–6)
ERYTHROCYTE [DISTWIDTH] IN BLOOD BY AUTOMATED COUNT: 12.9 % (ref 11.6–15.1)
GFR SERPL CREATININE-BSD FRML MDRD: 117 ML/MIN/1.73SQ M
GLUCOSE SERPL-MCNC: 110 MG/DL (ref 65–140)
HCT VFR BLD AUTO: 36.2 % (ref 36.5–49.3)
HGB BLD-MCNC: 11.7 G/DL (ref 12–17)
IMM GRANULOCYTES # BLD AUTO: 0.08 THOUSAND/UL (ref 0–0.2)
IMM GRANULOCYTES NFR BLD AUTO: 1 % (ref 0–2)
LYMPHOCYTES # BLD AUTO: 3.34 THOUSANDS/ΜL (ref 0.6–4.47)
LYMPHOCYTES NFR BLD AUTO: 22 % (ref 14–44)
MCH RBC QN AUTO: 28.7 PG (ref 26.8–34.3)
MCHC RBC AUTO-ENTMCNC: 32.3 G/DL (ref 31.4–37.4)
MCV RBC AUTO: 89 FL (ref 82–98)
MONOCYTES # BLD AUTO: 1.2 THOUSAND/ΜL (ref 0.17–1.22)
MONOCYTES NFR BLD AUTO: 8 % (ref 4–12)
NEUTROPHILS # BLD AUTO: 9.47 THOUSANDS/ΜL (ref 1.85–7.62)
NEUTS SEG NFR BLD AUTO: 63 % (ref 43–75)
NRBC BLD AUTO-RTO: 0 /100 WBCS
PLATELET # BLD AUTO: 320 THOUSANDS/UL (ref 149–390)
PMV BLD AUTO: 9.1 FL (ref 8.9–12.7)
POTASSIUM SERPL-SCNC: 3.6 MMOL/L (ref 3.5–5.3)
RBC # BLD AUTO: 4.07 MILLION/UL (ref 3.88–5.62)
SODIUM SERPL-SCNC: 139 MMOL/L (ref 136–145)
WBC # BLD AUTO: 14.89 THOUSAND/UL (ref 4.31–10.16)

## 2019-02-19 PROCEDURE — 36415 COLL VENOUS BLD VENIPUNCTURE: CPT | Performed by: PHYSICIAN ASSISTANT

## 2019-02-19 PROCEDURE — 96365 THER/PROPH/DIAG IV INF INIT: CPT

## 2019-02-19 PROCEDURE — 73201 CT UPPER EXTREMITY W/DYE: CPT

## 2019-02-19 PROCEDURE — 85025 COMPLETE CBC W/AUTO DIFF WBC: CPT | Performed by: PHYSICIAN ASSISTANT

## 2019-02-19 PROCEDURE — 99284 EMERGENCY DEPT VISIT MOD MDM: CPT

## 2019-02-19 PROCEDURE — 80048 BASIC METABOLIC PNL TOTAL CA: CPT | Performed by: PHYSICIAN ASSISTANT

## 2019-02-19 RX ORDER — CEFAZOLIN SODIUM 2 G/50ML
2000 SOLUTION INTRAVENOUS ONCE
Status: COMPLETED | OUTPATIENT
Start: 2019-02-19 | End: 2019-02-19

## 2019-02-19 RX ORDER — CLINDAMYCIN HYDROCHLORIDE 300 MG/1
300 CAPSULE ORAL EVERY 6 HOURS
Qty: 40 CAPSULE | Refills: 0 | Status: SHIPPED | OUTPATIENT
Start: 2019-02-19 | End: 2019-03-01

## 2019-02-19 RX ADMIN — IOHEXOL 85 ML: 350 INJECTION, SOLUTION INTRAVENOUS at 01:54

## 2019-02-19 RX ADMIN — CEFAZOLIN SODIUM 2000 MG: 2 SOLUTION INTRAVENOUS at 01:58

## 2019-02-19 NOTE — ED PROVIDER NOTES
History  Chief Complaint   Patient presents with    Arm Swelling     Pt reports to ED w c/o arm swelling  pt has hx of heroin use  Healthy appearing adult in no apparent distress  Patient is a 28year old male with complaints of right arm swelling that began today  Patient states that he injected his right arm with wellbutrin 4 days ago  He states that his arm was sore afterwards, but today it became more painful, red, and swollen  Patient denies fever, chills, nausea, vomiting  Prior to Admission Medications   Prescriptions Last Dose Informant Patient Reported?  Taking?   lithium carbonate 300 mg capsule   No No   Sig: Take 3 capsules (900 mg total) by mouth daily after dinner   methadone (DOLOPHINE) 10 mg tablet   No No   Sig: Take 10 tablets by mouth daily At 9AM,   prazosin (MINIPRESS) 1 mg capsule   No No   Sig: Take 1 capsule (1 mg total) by mouth daily at bedtime   risperiDONE (RisperDAL) 1 mg tablet   No No   Sig: Take 1 tablet (1 mg total) by mouth daily At 9AM   risperiDONE (RisperDAL) 2 mg tablet   No No   Sig: Take 1 tablet (2 mg total) by mouth daily after dinner      Facility-Administered Medications: None       Past Medical History:   Diagnosis Date    [facility]     Anxiety     Bipolar 1 disorder ([facility]     Depression     Excessive daytime sleepiness     Hallucination     Idiopathic hypersomnia     Psychiatric disorder     Psychiatric illness     Schizophrenia [facility]     [facility]     Suicide attempt ([facility] )     hx of attempts by hanging and OD in past 3-4 months    Tobacco use        Past Surgical History:   Procedure Laterality Date    INCISION AND DRAINAGE OF WOUND Right 4/26/2018    Procedure: INCISION AND DRAINAGE (I&D) EXTREMITY, washout and packing;  Surgeon: Evens Figueroa MD;  Location: [facility];  Service: General    SHOULDER SURGERY Right     titanium plate       Family History   Problem Relation Age of Onset    No Known Problems Mother    Lois Tam No Known Problems Father     No Known Problems Sister     No Known Problems Brother     No Known Problems Maternal Aunt     No Known Problems Paternal Aunt     No Known Problems Maternal Uncle     No Known Problems Paternal Uncle     No Known Problems Maternal Grandfather     No Known Problems Maternal Grandmother     No Known Problems Paternal Grandfather     No Known Problems Paternal Grandmother     No Known Problems Cousin     ADD / ADHD Neg Hx     Alcohol abuse Neg Hx     Anxiety disorder Neg Hx     Bipolar disorder Neg Hx     Completed Suicide  Neg Hx     Dementia Neg Hx     Depression Neg Hx     Drug abuse Neg Hx     OCD Neg Hx     Psychiatric Illness Neg Hx     Psychosis Neg Hx     Schizoaffective Disorder  Neg Hx     Schizophrenia Neg Hx     Self-Injury Neg Hx     Suicide Attempts Neg Hx      I have reviewed and agree with the history as documented  Social History     Tobacco Use    Smoking status: Current Every Day Smoker     Packs/day: 1 00     Years: 22 00     Pack years: 22 00     Types: Cigarettes    Smokeless tobacco: Never Used   Substance Use Topics    Alcohol use: No     Comment: drinks 1-2 drinks yearly    Drug use: Yes     Types: Amphetamines, Marijuana, Heroin     Comment: OD on xanax and cocaine 2 days ago, cocaine use a couple times a month        Review of Systems   Constitutional: Negative for fever  Skin: Positive for wound  All other systems reviewed and are negative  Physical Exam  Physical Exam   Constitutional: He is oriented to person, place, and time  He appears well-developed and well-nourished  HENT:   Head: Normocephalic and atraumatic  Eyes: Pupils are equal, round, and reactive to light  Conjunctivae and EOM are normal    Neck: Normal range of motion  Cardiovascular: Normal rate, regular rhythm and normal heart sounds  Pulmonary/Chest: Effort normal and breath sounds normal    Abdominal: Soft   Bowel sounds are normal  Musculoskeletal:        Arms:  Neurological: He is alert and oriented to person, place, and time  Skin: Skin is warm and dry  Psychiatric: He has a normal mood and affect  His behavior is normal  Judgment and thought content normal    Vitals reviewed  Vital Signs  ED Triage Vitals [02/19/19 0040]   Temperature Pulse Respirations Blood Pressure SpO2   97 5 °F (36 4 °C) 91 20 144/92 99 %      Temp Source Heart Rate Source Patient Position - Orthostatic VS BP Location FiO2 (%)   Oral Monitor Lying Right arm --      Pain Score       8           Vitals:    02/19/19 0040   BP: 144/92   Pulse: 91   Patient Position - Orthostatic VS: Lying       Visual Acuity      ED Medications  Medications   ceFAZolin (ANCEF) IVPB (premix) 2,000 mg (0 mg Intravenous Stopped 2/19/19 0218)   iohexol (OMNIPAQUE) 350 MG/ML injection (MULTI-DOSE) 100 mL (85 mL Intravenous Given 2/19/19 0154)       Diagnostic Studies  Results Reviewed     Procedure Component Value Units Date/Time    Basic metabolic panel [[id number]] Collected:  02/19/19 0117    Lab Status:  Final result Specimen:  Blood from Arm, Left Updated:  02/19/19 0131     Sodium 139 mmol/L      Potassium 3 6 mmol/L      Chloride 102 mmol/L      CO2 28 mmol/L      ANION GAP 9 mmol/L      BUN 10 mg/dL      Creatinine 0 80 mg/dL      Glucose 110 mg/dL      Calcium 8 7 mg/dL      eGFR 117 ml/min/1 73sq m     Narrative:       National Kidney Disease Education Program recommendations are as follows:  GFR calculation is accurate only with a steady state creatinine  Chronic Kidney disease less than 60 ml/min/1 73 sq  meters  Kidney failure less than 15 ml/min/1 73 sq  meters      CBC and differential [[id number]]  (Abnormal) Collected:  02/19/19 0117    Lab Status:  Final result Specimen:  Blood from Arm, Left Updated:  02/19/19 0122     WBC 14 89 Thousand/uL      RBC 4 07 Million/uL      Hemoglobin 11 7 g/dL      Hematocrit 36 2 %      MCV 89 fL      MCH 28 7 pg      MCHC 32 3 g/dL RDW 12 9 %      MPV 9 1 fL      Platelets 895 Thousands/uL      nRBC 0 /100 WBCs      Neutrophils Relative 63 %      Immat GRANS % 1 %      Lymphocytes Relative 22 %      Monocytes Relative 8 %      Eosinophils Relative 5 %      Basophils Relative 1 %      Neutrophils Absolute 9 47 Thousands/µL      Immature Grans Absolute 0 08 Thousand/uL      Lymphocytes Absolute 3 34 Thousands/µL      Monocytes Absolute 1 20 Thousand/µL      Eosinophils Absolute 0 68 Thousand/µL      Basophils Absolute 0 12 Thousands/µL                  CT forearm right w contrast   Final Result by Truong Fuentes MD (02/19 0206)      Filling defect within a superficial vein along the radial surface of the forearm compatible with thrombophlebitis  Mild skin thickening and subcutaneous edema about the forearm compatible with cellulitis  Workstation performed: [id number]                    Procedures  Procedures       Phone Contacts  ED Phone Contact    ED Course                               MDM  Number of Diagnoses or Management Options  Cellulitis:   Diagnosis management comments: Patient is a 70-year-old male presents emergency department complaints of right forearm swelling and pain  Patient states he injected Wellbutrin into his right arm about 4 days ago  Patient has been afebrile  Patient is given a dose of IV Ancef in emergency department  CT scan was performed does not show any evidence of abscess formation  Patient's continue oral antibiotics at home he is to have follow-up wound check in the next 48 hours  The demarcation was marked does arm  He was informed that if it should extend outside of the boundaries to follow up in emergency room for re-evaluation  Patient stable for discharge         Amount and/or Complexity of Data Reviewed  Clinical lab tests: ordered and reviewed  Tests in the radiology section of CPT®: ordered and reviewed    Risk of Complications, Morbidity, and/or Mortality  Presenting problems: moderate  Diagnostic procedures: moderate  Management options: moderate    Patient Progress  Patient progress: stable      Disposition  Final diagnoses:   Cellulitis     Time reflects when diagnosis was documented in both MDM as applicable and the Disposition within this note     Time User Action Codes Description Comment    2/19/2019  2:12 AM Tres Fofana Josiah [L03 90] Cellulitis       ED Disposition     ED Disposition Condition Date/Time Comment    Discharge Good Tue Feb 19, 2019  2:12 AM Erin Jaxson discharge to home/self care  Follow-up Information    None         Discharge Medication List as of 2/19/2019  2:13 AM      START taking these medications    Details   clindamycin (CLEOCIN) 300 MG capsule Take 1 capsule (300 mg total) by mouth every 6 (six) hours for 10 days, Starting Tue 2/19/2019, Until Fri 3/1/2019, Print         CONTINUE these medications which have NOT CHANGED    Details   lithium carbonate 300 mg capsule Take 3 capsules (900 mg total) by mouth daily after dinner, Starting Wed 7/11/2018, Print      methadone (DOLOPHINE) 10 mg tablet Take 10 tablets by mouth daily At 9AM,, Starting Thu 7/12/2018, No Print      prazosin (MINIPRESS) 1 mg capsule Take 1 capsule (1 mg total) by mouth daily at bedtime, Starting Wed 7/11/2018, Print      !! risperiDONE (RisperDAL) 1 mg tablet Take 1 tablet (1 mg total) by mouth daily At 9AM, Starting Wed 7/11/2018, Print      !! risperiDONE (RisperDAL) 2 mg tablet Take 1 tablet (2 mg total) by mouth daily after dinner, Starting Wed 7/11/2018, Print       !! - Potential duplicate medications found  Please discuss with provider  No discharge procedures on file      ED Provider  Electronically Signed by           Bhakti Odom PA-C  02/19/19 1190

## 2019-03-14 NOTE — ED NOTES
Quality 154 Part B: Falls: Risk Screening (Should Be Reported With Measure 155.): Patient screened for future fall risk; documentation of two or more falls in the past year or any fall with injury in the past year Received report from Rosmery Zaragoza, ED Technician  Patient appears to resting on the bed in his assigned room  Patient is showing no signs of distress  Will continue to monitor       Rebeca Valeriano  06/25/18 8769 Quality 431: Preventive Care And Screening: Unhealthy Alcohol Use - Screening: Patient screened for unhealthy alcohol use using a single question and scores less than 2 times per year Quality 47: Advance Care Plan: Advance Care Planning discussed and documented; advance care plan or surrogate decision maker documented in the medical record. Detail Level: Detailed Quality 110: Preventive Care And Screening: Influenza Immunization: Influenza immunization was not ordered or administered, reason not given Quality 134: Screening For Clinical Depression And Follow-Up Plan: The patient was screened for depression and the screen was negative and no follow up required Quality 130: Documentation Of Current Medications In The Medical Record: Current Medications Documented Quality 226: Preventive Care And Screening: Tobacco Use: Screening And Cessation Intervention: Patient screened for tobacco and never smoked Name And Contact Information For Health Care Proxy: Beck Mott () Quality 154 Part A: Falls: Risk Assessment (Should Be Reported With Measure 155.): Falls risk assessment completed and documented in the past 12 months. Quality 131: Pain Assessment And Follow-Up: Pain assessment using a standardized tool is documented as negative, no follow-up plan required

## 2021-02-17 NOTE — TELEPHONE ENCOUNTER
Ok,  We cannot refill his adderall, anymore  He needs to get the diagnostic MSLT to see if any narcolepsy vs idiopathic hypersomnia
no

## 2022-11-29 ENCOUNTER — APPOINTMENT (OUTPATIENT)
Dept: LAB | Facility: HOSPITAL | Age: 37
End: 2022-11-29

## 2022-11-29 DIAGNOSIS — F19.11 ANTIDEPRESSANT TYPE ABUSE, IN REMISSION (HCC): ICD-10-CM

## 2022-11-29 DIAGNOSIS — E78.2 MIXED HYPERLIPIDEMIA: ICD-10-CM

## 2022-11-29 DIAGNOSIS — I51.9 MYXEDEMA HEART DISEASE: ICD-10-CM

## 2022-11-29 DIAGNOSIS — D64.9 ANEMIA, UNSPECIFIED TYPE: ICD-10-CM

## 2022-11-29 DIAGNOSIS — E03.9 MYXEDEMA HEART DISEASE: ICD-10-CM

## 2022-11-29 LAB
ALBUMIN SERPL BCP-MCNC: 3.7 G/DL (ref 3.5–5)
ALP SERPL-CCNC: 118 U/L (ref 46–116)
ALT SERPL W P-5'-P-CCNC: 85 U/L (ref 12–78)
ANION GAP SERPL CALCULATED.3IONS-SCNC: 7 MMOL/L (ref 4–13)
AST SERPL W P-5'-P-CCNC: 88 U/L (ref 5–45)
BASOPHILS # BLD AUTO: 0.1 THOUSANDS/ÂΜL (ref 0–0.1)
BASOPHILS NFR BLD AUTO: 1 % (ref 0–1)
BILIRUB SERPL-MCNC: 0.55 MG/DL (ref 0.2–1)
BUN SERPL-MCNC: 14 MG/DL (ref 5–25)
CALCIUM SERPL-MCNC: 8.9 MG/DL (ref 8.3–10.1)
CHLORIDE SERPL-SCNC: 107 MMOL/L (ref 96–108)
CHOLEST SERPL-MCNC: 181 MG/DL
CO2 SERPL-SCNC: 26 MMOL/L (ref 21–32)
CREAT SERPL-MCNC: 0.99 MG/DL (ref 0.6–1.3)
EOSINOPHIL # BLD AUTO: 0.46 THOUSAND/ÂΜL (ref 0–0.61)
EOSINOPHIL NFR BLD AUTO: 4 % (ref 0–6)
ERYTHROCYTE [DISTWIDTH] IN BLOOD BY AUTOMATED COUNT: 12.7 % (ref 11.6–15.1)
GFR SERPL CREATININE-BSD FRML MDRD: 96 ML/MIN/1.73SQ M
GLUCOSE P FAST SERPL-MCNC: 98 MG/DL (ref 65–99)
HCT VFR BLD AUTO: 40 % (ref 36.5–49.3)
HCV AB SER QL: NORMAL
HDLC SERPL-MCNC: 47 MG/DL
HGB BLD-MCNC: 12.8 G/DL (ref 12–17)
IMM GRANULOCYTES # BLD AUTO: 0.06 THOUSAND/UL (ref 0–0.2)
IMM GRANULOCYTES NFR BLD AUTO: 1 % (ref 0–2)
LDLC SERPL CALC-MCNC: 97 MG/DL (ref 0–100)
LYMPHOCYTES # BLD AUTO: 2.42 THOUSANDS/ÂΜL (ref 0.6–4.47)
LYMPHOCYTES NFR BLD AUTO: 18 % (ref 14–44)
MCH RBC QN AUTO: 29.4 PG (ref 26.8–34.3)
MCHC RBC AUTO-ENTMCNC: 32 G/DL (ref 31.4–37.4)
MCV RBC AUTO: 92 FL (ref 82–98)
MONOCYTES # BLD AUTO: 0.99 THOUSAND/ÂΜL (ref 0.17–1.22)
MONOCYTES NFR BLD AUTO: 7 % (ref 4–12)
NEUTROPHILS # BLD AUTO: 9.27 THOUSANDS/ÂΜL (ref 1.85–7.62)
NEUTS SEG NFR BLD AUTO: 69 % (ref 43–75)
NONHDLC SERPL-MCNC: 134 MG/DL
NRBC BLD AUTO-RTO: 0 /100 WBCS
PLATELET # BLD AUTO: 263 THOUSANDS/UL (ref 149–390)
PMV BLD AUTO: 10 FL (ref 8.9–12.7)
POTASSIUM SERPL-SCNC: 3.9 MMOL/L (ref 3.5–5.3)
PROT SERPL-MCNC: 7.2 G/DL (ref 6.4–8.4)
RBC # BLD AUTO: 4.36 MILLION/UL (ref 3.88–5.62)
SODIUM SERPL-SCNC: 140 MMOL/L (ref 135–147)
TRIGL SERPL-MCNC: 184 MG/DL
TSH SERPL DL<=0.05 MIU/L-ACNC: 1.68 UIU/ML (ref 0.45–4.5)
WBC # BLD AUTO: 13.3 THOUSAND/UL (ref 4.31–10.16)

## 2022-11-30 LAB — HIV 1+2 AB+HIV1 P24 AG SERPL QL IA: NORMAL

## 2023-05-14 ENCOUNTER — HOSPITAL ENCOUNTER (EMERGENCY)
Facility: HOSPITAL | Age: 38
End: 2023-05-16
Attending: EMERGENCY MEDICINE

## 2023-05-14 DIAGNOSIS — F29 PSYCHOSIS (HCC): Primary | ICD-10-CM

## 2023-05-14 LAB
AMPHETAMINES SERPL QL SCN: NEGATIVE
BARBITURATES UR QL: NEGATIVE
BENZODIAZ UR QL: NEGATIVE
COCAINE UR QL: NEGATIVE
FLUAV RNA RESP QL NAA+PROBE: NEGATIVE
FLUBV RNA RESP QL NAA+PROBE: NEGATIVE
METHADONE UR QL: NEGATIVE
OPIATES UR QL SCN: NEGATIVE
OXYCODONE+OXYMORPHONE UR QL SCN: NEGATIVE
PCP UR QL: NEGATIVE
RSV RNA RESP QL NAA+PROBE: NEGATIVE
SARS-COV-2 RNA RESP QL NAA+PROBE: NEGATIVE
THC UR QL: NEGATIVE

## 2023-05-14 RX ORDER — OLANZAPINE 5 MG/1
10 TABLET, ORALLY DISINTEGRATING ORAL ONCE
Status: COMPLETED | OUTPATIENT
Start: 2023-05-14 | End: 2023-05-14

## 2023-05-14 RX ORDER — NICOTINE 21 MG/24HR
21 PATCH, TRANSDERMAL 24 HOURS TRANSDERMAL ONCE
Status: COMPLETED | OUTPATIENT
Start: 2023-05-14 | End: 2023-05-15

## 2023-05-14 RX ORDER — LORAZEPAM 1 MG/1
1 TABLET ORAL ONCE
Status: COMPLETED | OUTPATIENT
Start: 2023-05-14 | End: 2023-05-14

## 2023-05-14 RX ORDER — BUPRENORPHINE AND NALOXONE 8; 2 MG/1; MG/1
8 FILM, SOLUBLE BUCCAL; SUBLINGUAL DAILY
Status: DISCONTINUED | OUTPATIENT
Start: 2023-05-15 | End: 2023-05-16 | Stop reason: HOSPADM

## 2023-05-14 RX ADMIN — NICOTINE 21 MG: 21 PATCH TRANSDERMAL at 20:08

## 2023-05-14 RX ADMIN — LORAZEPAM 1 MG: 1 TABLET ORAL at 19:22

## 2023-05-14 RX ADMIN — OLANZAPINE 10 MG: 5 TABLET, ORALLY DISINTEGRATING ORAL at 19:22

## 2023-05-14 NOTE — ED PROVIDER NOTES
"History  Chief Complaint   Patient presents with   • Psychiatric Evaluation     Patient co \"feeling anxious, racing thoughts and confused  \" Patient reports not sleeping in 2 days  Patient reports does not remember much last night and woke up this morning and his room was destroyed  Patient is a 27-year-old male past medical history of schizophrenia, PTSD, substance abuse, anxiety, bipolar disorder, opioid abuse disorder on Suboxone presenting for paranoia,  galindo  Patient states that he has little to no memory of the last 3 days and states that his roommate told him that he was behaving erratically and that his room was destroyed, does not member doing this  States this is happened in the past when he has been manic/psychotic  States he has been taking his medications but does note mild visual hallucinations as well as increased paranoia and racing thoughts  Denies any SI, HI and denies any drug or alcohol use that he is aware of but states he is hoping he did not do anything in the last 3 days  He states that his took his daughter back with her mother today but that she was in the house previously  Prior to Admission Medications   Prescriptions Last Dose Informant Patient Reported?  Taking?   lithium carbonate 300 mg capsule   No No   Sig: Take 3 capsules (900 mg total) by mouth daily after dinner   methadone (DOLOPHINE) 10 mg tablet   No No   Sig: Take 10 tablets by mouth daily At 9AM,   prazosin (MINIPRESS) 1 mg capsule   No No   Sig: Take 1 capsule (1 mg total) by mouth daily at bedtime   risperiDONE (RisperDAL) 1 mg tablet   No No   Sig: Take 1 tablet (1 mg total) by mouth daily At 9AM   risperiDONE (RisperDAL) 2 mg tablet   No No   Sig: Take 1 tablet (2 mg total) by mouth daily after dinner      Facility-Administered Medications: None       Past Medical History:   Diagnosis Date   • Alcoholism ([facility] )    • Anxiety    • [facility] ([facility] )    • Depression    • Excessive daytime sleepiness  " • Hallucination    • Idiopathic hypersomnia    • Psychiatric disorder    • Psychiatric illness    • Schizophrenia ([facility] )    • Substance abuse ([facility] )    • Suicide attempt ([facility] )     hx of attempts by hanging and OD in past 3-4 months   • Tobacco use        Past Surgical History:   Procedure Laterality Date   • INCISION AND DRAINAGE OF WOUND Right 4/26/2018    Procedure: INCISION AND DRAINAGE (I&D) EXTREMITY, washout and packing;  Surgeon: Danielle Carrizales MD;  Location: [facility] OR;  Service: General   • SHOULDER SURGERY Right     titanium plate       Family History   Problem Relation Age of Onset   • No Known Problems Mother    • No Known Problems Father    • No Known Problems Sister    • No Known Problems Brother    • No Known Problems Maternal Aunt    • No Known Problems Paternal Aunt    • No Known Problems Maternal Uncle    • No Known Problems Paternal Uncle    • No Known Problems Maternal Grandfather    • No Known Problems Maternal Grandmother    • No Known Problems Paternal Grandfather    • No Known Problems Paternal Grandmother    • No Known Problems Cousin    • ADD / ADHD Neg Hx    • Alcohol abuse Neg Hx    • Anxiety disorder Neg Hx    • Bipolar disorder Neg Hx    • Completed Suicide  Neg Hx    • Dementia Neg Hx    • Depression Neg Hx    • Drug abuse Neg Hx    • OCD Neg Hx    • Psychiatric Illness Neg Hx    • Psychosis Neg Hx    • Schizoaffective Disorder  Neg Hx    • Schizophrenia Neg Hx    • Self-Injury Neg Hx    • Suicide Attempts Neg Hx      I have reviewed and agree with the history as documented      E-Cigarette/Vaping   • E-Cigarette Use Never User      E-Cigarette/Vaping Substances     Social History     Tobacco Use   • Smoking status: Every Day     Packs/day: 1 00     Years: 22 00     Pack years: 22 00     Types: Cigarettes   • Smokeless tobacco: Never   Vaping Use   • Vaping Use: Never used   Substance Use Topics   • Alcohol use: No     Comment: drinks 1-2 drinks yearly   • Drug use: Yes     Types: Amphetamines, Marijuana, Heroin     Comment: OD on xanax and cocaine 2 days ago, cocaine use a couple times a month       Review of Systems   All other systems reviewed and are negative  Physical Exam  Physical Exam  Vitals reviewed  Constitutional:       General: He is not in acute distress  Appearance: Normal appearance  He is not ill-appearing  HENT:      Head: Normocephalic and atraumatic  Mouth/Throat:      Mouth: Mucous membranes are moist    Eyes:      Conjunctiva/sclera: Conjunctivae normal    Cardiovascular:      Rate and Rhythm: Normal rate and regular rhythm  Heart sounds: Normal heart sounds  Pulmonary:      Effort: Pulmonary effort is normal       Breath sounds: Normal breath sounds  Musculoskeletal:         General: No swelling  Normal range of motion  Cervical back: Neck supple  Skin:     General: Skin is warm and dry  Neurological:      General: No focal deficit present  Mental Status: He is alert        Coordination: Coordination normal       Gait: Gait normal       Comments: Oriented to person place and situation but not time   Psychiatric:         Mood and Affect: Mood normal          Vital Signs  ED Triage Vitals [05/14/23 1730]   Temperature Pulse Respirations Blood Pressure SpO2   97 9 °F (36 6 °C) 95 18 154/97 99 %      Temp src Heart Rate Source Patient Position - Orthostatic VS BP Location FiO2 (%)   -- Monitor Sitting Left arm --      Pain Score       --           Vitals:    05/14/23 1730   BP: 154/97   Pulse: 95   Patient Position - Orthostatic VS: Sitting         Visual Acuity      ED Medications  Medications   buprenorphine-naloxone (Suboxone) film 8 mg (has no administration in time range)   nicotine (NICODERM CQ) 21 mg/24 hr TD 24 hr patch 21 mg (21 mg Transdermal Medication Applied 5/14/23 2008)   OLANZapine (ZyPREXA ZYDIS) dispersible tablet 10 mg (10 mg Oral Given 5/14/23 1922)   LORazepam (ATIVAN) tablet 1 mg (1 mg Oral Given 5/14/23 1922) Diagnostic Studies  Results Reviewed     Procedure Component Value Units Date/Time    FLU/RSV/COVID - if FLU/RSV clinically relevant [[id number]]  (Normal) Collected: 05/14/23 9431    Lab Status: Final result Specimen: Nares from Nose Updated: 05/14/23 2017     SARS-CoV-2 Negative     INFLUENZA A PCR Negative     INFLUENZA B PCR Negative     RSV PCR Negative    Narrative:      FOR PEDIATRIC PATIENTS - copy/paste COVID Guidelines URL to browser: [email or web address]  [employer]uls[employer]x    SARS-CoV-2 assay is a Nucleic Acid Amplification assay intended for the  qualitative detection of nucleic acid from SARS-CoV-2 in nasopharyngeal  swabs  Results are for the presumptive identification of SARS-CoV-2 RNA  Positive results are indicative of infection with SARS-CoV-2, the virus  causing COVID-19, but do not rule out bacterial infection or co-infection  with other viruses  Laboratories within the [place] and its  territories are required to report all positive results to the appropriate  public health authorities  Negative results do not preclude SARS-CoV-2  infection and should not be used as the sole basis for treatment or other  patient management decisions  Negative results must be combined with  clinical observations, patient history, and epidemiological information  This test has not been FDA cleared or approved  This test has been authorized by FDA under an Emergency Use Authorization  (EUA)  This test is only authorized for the duration of time the  declaration that circumstances exist justifying the authorization of the  emergency use of an in vitro diagnostic tests for detection of SARS-CoV-2  virus and/or diagnosis of COVID-19 infection under section 564(b)(1) of  the Act, 21 U  S C  003VUZ-8(M)(8), unless the authorization is terminated  or revoked sooner  The test has been validated but independent review by FDA  and CLIA is pending      Test performed using [employer] GeneXpert: This RT-PCR assay targets N2,  a region unique to SARS-CoV-2  A conserved region in the E-gene was chosen  for pan-Sarbecovirus detection which includes SARS-CoV-2  According to CMS-2020-01-R, this platform meets the definition of high-throughput technology  Rapid drug screen, urine [[id number]]  (Normal) Collected: 05/14/23 1857    Lab Status: Final result Specimen: Urine, Clean Catch Updated: 05/14/23 1931     Amph/Meth UR Negative     Barbiturate Ur Negative     Benzodiazepine Urine Negative     Cocaine Urine Negative     Methadone Urine Negative     Opiate Urine Negative     PCP Ur Negative     THC Urine Negative     Oxycodone Urine Negative    Narrative:      FOR MEDICAL PURPOSES ONLY  IF CONFIRMATION NEEDED PLEASE CONTACT THE LAB WITHIN 5 DAYS  Drug Screen Cutoff Levels:  AMPHETAMINE/METHAMPHETAMINES  1000 ng/mL  BARBITURATES     200 ng/mL  BENZODIAZEPINES     200 ng/mL  COCAINE      300 ng/mL  METHADONE      300 ng/mL  OPIATES      300 ng/mL  PHENCYCLIDINE     25 ng/mL  THC       50 ng/mL  OXYCODONE      100 ng/mL                 No orders to display              Procedures  Procedures         ED Course                               SBIRT 20yo+    Flowsheet Row Most Recent Value   Initial Alcohol Screen: US AUDIT-C     1  How often do you have a drink containing alcohol? 1 Filed at: 05/14/2023 1850   2  How many drinks containing alcohol do you have on a typical day you are drinking? 0 Filed at: 05/14/2023 1850   3a  Male UNDER 65: How often do you have five or more drinks on one occasion? 1 Filed at: 05/14/2023 1850   Audit-C Score 2 Filed at: 05/14/2023 1850   AIDEE: How many times in the past year have you    Used an illegal drug or used a prescription medication for non-medical reasons?  Never Filed at: 05/14/2023 [address] [place] [address] [id number]  Patient is a 42-year-old male past medical history of schizophrenia, PTSD, substance abuse disorder on Suboxone, anxiety presenting for galindo, psychosis  Patient is well-appearing at bedside with stable vitals and in no acute distress  He is following commands with no gross amount is on neurologic exam, no signs of trauma to the head, and answering questions appropriately however notes increasing paranoia consistent with prior bipolar galindo  Will consult crisis for further management, give psychiatric medication and continue to monitor  Will ask crisis to discuss with children youth this patient was responsible for a minor during this period of loss time    Amount and/or Complexity of Data Reviewed  Labs: ordered  Risk  Prescription drug management  Disposition  Final diagnoses:   Psychosis ([address] )     Time reflects when diagnosis was documented in both MDM as applicable and the Disposition within this note     Time User Action Codes Description Comment    5/14/2023  6:58 PM Jemima Jameson Add [F29] Psychosis [facility])       ED Disposition     ED Disposition   Transfer to [address] [place] [address]   --    Date/Time   Sun May 14, 2023  6:58 PM    Comment   Allegra Chavez should be transferred out to [facility] and has been medically cleared  MD Documentation    Mclean Marrow Most Recent Value   Sending MD Dr Nnio Adrian    None         Patient's Medications   Discharge Prescriptions    No medications on file       No discharge procedures on file      PDMP Review     None          ED Provider  Electronically Signed by           Meliza Brumfield, DO  05/15/23 [facility], DO  05/15/23 0045

## 2023-05-14 NOTE — ED NOTES
Pts room stripped and pt changed into paper scrubs at this time        Laith Horowitz RN  05/14/23 8477

## 2023-05-14 NOTE — LETTER
To Anu Delgado,      Please be advised, DEVON Mckeon B : 1985 is currently in the Emergency Room at [address] [place] [address]  Mr Zhane Mcpherson arrived on  05/14/2023 as a self-referral from home  Mr Zhane Mcpherson will be transferred and admitted to another facility  At this time I do not have the accepting facility name and or location  Sincerely,  Kalyan Sotelo    Crisis Intervention Specialist  [email or web address]  [facility] [address]   [address] [place] [address] 89  [phone] - Phone  [phone] - Fax

## 2023-05-14 NOTE — ED NOTES
Belongings Patient Hailey Jeter #3     Shaka Moreau  05/14/23 1939       Shaka Moreau  05/14/23 1940

## 2023-05-14 NOTE — LETTER
[address] [address]  [address] [facility] [address]  Dept: [phone]      EMTALA TRANSFER CONSENT    NAME Lani Randall                                         1985                              MRN [id number]    I have been informed of my rights regarding examination, treatment, and transfer   by Dr Joel De,*    Benefits: Specialized equipment and/or services available at the receiving facility (Include comment)________________________    Risks: Potential for delay in receiving treatment      Consent for Transfer:  I acknowledge that my medical condition has been evaluated and explained to me by the emergency department physician or other qualified medical person and/or my attending physician, who has recommended that I be transferred to the service of  Accepting Physician: Dr Shaheed Fernandez at [address] Name, [address] : [facility], [address] [address]  The above potential benefits of such transfer, the potential risks associated with such transfer, and the probable risks of not being transferred have been explained to me, and I fully understand them  The doctor has explained that, in my case, the benefits of transfer outweigh the risks  I agree to be transferred  I authorize the performance of emergency medical procedures and treatments upon me in both transit and upon arrival at the receiving facility  Additionally, I authorize the release of any and all medical records to the receiving facility and request they be transported with me, if possible  I understand that the safest mode of transportation during a medical emergency is an ambulance and that the Hospital advocates the use of this mode of transport   Risks of traveling to the receiving facility by car, including absence of medical control, life sustaining equipment, such as oxygen, and medical personnel has been explained to me and I fully understand them     ([facility])  [X]  I consent to the stated transfer and to be transported by ambulance/helicopter  [  ]  I consent to the stated transfer, but refuse transportation by ambulance and accept full responsibility for my transportation by car  I understand the risks of non-ambulance transfers and I exonerate the Hospital and its staff from any deterioration in my condition that results from this refusal     X___________________________________________    DATE  05/15/23  TIME________  Signature of patient or legally responsible individual signing on patient behalf           RELATIONSHIP TO PATIENT_________________________                  Provider Certification    NAME Fifi Chanel                                      1985                              MRN [id number]    A medical screening exam was performed on the above named patient  Based on the examination:    Condition Necessitating Transfer The encounter diagnosis was Psychosis ([facility] [address] )  Patient Condition: The patient has been stabilized such that within reasonable medical probability, no material deterioration of the patient condition or the condition of the unborn child(tima) is likely to result from the transfer    Reason for Transfer: Level of Care needed not available at this facility    Transfer Requirements: [address] [facility],6Th Floor [address] [facility], [address] [place] [address] 60   · Space available and qualified personnel available for treatment as acknowledged by Lilly Soriano, [address] [employer] [facility], [phone]  · Agreed to accept transfer and to provide appropriate medical treatment as acknowledged by       Dr Adamaris Duran  · Appropriate medical records of the examination and treatment of the patient are provided at the time of transfer   [address] _______  · Transfer will be performed by qualified personnel from           and appropriate transfer equipment as required, including the use of necessary and appropriate life support measures      Provider Certification: I have examined the patient and explained the following risks and benefits of being transferred/refusing transfer to the patient/family:         Based on these reasonable risks and benefits to the patient and/or the unborn child(tima), and based upon the information available at the time of the patient’s examination, I certify that the medical benefits reasonably to be expected from the provision of appropriate medical treatments at another medical facility outweigh the increasing risks, if any, to the individual’s medical condition, and in the case of labor to the unborn child, from effecting the transfer      X____________________________________________ DATE 05/15/23        TIME_______      ORIGINAL - SEND TO MEDICAL RECORDS   COPY - SEND WITH PATIENT DURING TRANSFER

## 2023-05-15 RX ORDER — NICOTINE 21 MG/24HR
21 PATCH, TRANSDERMAL 24 HOURS TRANSDERMAL ONCE
Status: DISCONTINUED | OUTPATIENT
Start: 2023-05-15 | End: 2023-05-16 | Stop reason: HOSPADM

## 2023-05-15 RX ADMIN — BUPRENORPHINE AND NALOXONE 8 MG: 8; 2 FILM BUCCAL; SUBLINGUAL at 10:23

## 2023-05-15 RX ADMIN — NICOTINE 21 MG: 21 PATCH, EXTENDED RELEASE TRANSDERMAL at 21:00

## 2023-05-15 NOTE — ED NOTES
CW received return call from [facility] -  [facility]  Facility unable to accept pt due to pt's current methandone / suboxone meds      TDS, CW

## 2023-05-15 NOTE — ED NOTES
MAYUR placed call to [facility], spoke w/Smiley    Primary Ins is Medicare A&B - no pre-cert required    [employer] Authorization for admission:   Phone call placed to [facility]  Phone number: [phone]     Spoke to Opzi     ** days approved  Level of care: 201  Review on **  Authorization # COB requested and completed  Paperwork to be faxed upon pt's discharge        EVS (Eligibility Verification System) called - [phone]  Automated system indicates: **    Insurance Authorization for Transportation:    Phone call placed to **  Phone number **  Spoke to **     Authorization #: **    RYAN, MAYUR

## 2023-05-15 NOTE — ED NOTES
Kenny Blizzard has splinters in fingers of right hand causing pain, requesting to be treated   Made nurse aware     Lolis Woods  05/14/23 5949

## 2023-05-15 NOTE — ED NOTES
With pt's verbal permission, CW called pt's mother Harsh Tripathi to obtain the phone # for pt's ex-wife  Harsh Tripathi noted the # is: [phone]     21:00 CW spoke with pt's ex-wife Nicholos Frankel, who stated pt's roommate brought pt's daughter, Anca Sanchez back home this morning      DEBBY Mace, CIS ll  05/14/23 22:28

## 2023-05-15 NOTE — ED NOTES
"CW placed call to [address] [place] [address], spoke w/  Basia Valle requesting dosage / hx list for pt's prescribed suboxone   indicates, \"It is being sent now  \"    RYAN, CW      "

## 2023-05-15 NOTE — ED NOTES
"CW spoke w/pt regarding prescribing office / doctor for Methadone  As per pt,  is located on [facility], \"I have a card w/the number in my belongings  \"    Gaye Goldman found card w/contact number for Judith Valiente, [phone], left  requesting return call to obtain dosage hx      TDS, CW  "

## 2023-05-15 NOTE — ED NOTES
Received call from pt mother asking status of pt to check-in  Pt mother was informed that pt will be going to [facility] tomorrow, which is in [place]  Pt mother inquired why pt was going so far and was not being transferred in-network  Explained referral & intake process to pt mother & potential for no in-network beds so pt is referred elsewhere  Pt mother asked what pt was doing at the moment; pt sleeping/resting/watching TV  Pt mother was glad & also thankful pt is still here & receiving his medications  Pt mother asked that this writer advise pt when he awakes that he can call her anytime at home

## 2023-05-15 NOTE — ED NOTES
CW received request from pt to send  a letter confirming pt is currently in the ED and will be transferred for IP tx      CW typed and faxed letter to pt's , Felicia Asher of [facility] and [place] [facility]      TDS, CW

## 2023-05-15 NOTE — ED NOTES
Both pt and Dr Bruno Shown signed the 12 document      Yojana Hernandez, [facility]  05/14/23 23:22

## 2023-05-15 NOTE — ED NOTES
CW placed call to the following facilities:    [facility], spoke w/Elvira  CW may send clinicals for review  [facility], silvana w/MAYUR Clarke may send clinicals for review   SENT    MAYUR DAVIS

## 2023-05-15 NOTE — ED NOTES
CW placed calls to the following facilities:    [facility], as per admissions, NO BEDS  [facility] , as per Orest Boast may send clinicals for review   SENT    TDS, MAYUR

## 2023-05-15 NOTE — ED NOTES
CW received call from [address] [place] [address] [place] inquiring on verification of pt's suboxone  CW sent paperwork from pt's doctor via fax to [facility]      TDS, CW

## 2023-05-15 NOTE — ED NOTES
Patient contacted  and doctors office  [employer] and parole office requests fax stating he is currently in facility and unable to attend meeting w/ officer 05/17  CW made aware  Patient gave phone to staff with doctors office on the line to provide office fax number for medication list/ dosage hx  Fax number provided to doctors office, advised fax will be sent shortly       Kesha Vigil  05/15/23 1243
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
## 2023-05-15 NOTE — ED NOTES
Came back to assess pt hands  Pt resting comfortably   Vitals to be deferred to promote rest       Adeline Liu RN  05/15/23 9827

## 2023-05-15 NOTE — ED NOTES
CW received return call from Sophia  72  Pt accepted to [facility]  Under the care of Dr Beni Mai will be available on Tuesday 5/16/2023    CW to call in the morning to inquire on time pt may be able to arrive      TDS, CW

## 2023-05-16 VITALS
RESPIRATION RATE: 18 BRPM | HEART RATE: 55 BPM | DIASTOLIC BLOOD PRESSURE: 84 MMHG | SYSTOLIC BLOOD PRESSURE: 124 MMHG | OXYGEN SATURATION: 99 % | TEMPERATURE: 98 F

## 2023-05-16 RX ADMIN — BUPRENORPHINE AND NALOXONE 8 MG: 8; 2 FILM BUCCAL; SUBLINGUAL at 08:30

## 2023-05-16 NOTE — ED NOTES
Received a call from Sultana at [facility]  She was requesting an ETA for this patient, and said they needed him to arrive at 10:00 or after 11:00 today (5/16/23)  CIS read chart notes and checked the Roundtrip site and did not find a transport request submitted as of this time  Completed Roundtrip request for Mr Anne Barreto to [place]  Awaiting transport confirmation and pickup time

## 2023-05-16 NOTE — ED NOTES
CTS will  Mr Shelia Hsu for transport to [facility] at 10:30  Spoke with Kaylie Garner at [facility] to ensure they were aware of ETA

## 2023-05-16 NOTE — ED NOTES
Met with pt to review EMTALA & obtain pt signature  Pt signed EMTALA for transfer to [facility] tomorrow, 5/16/2023  Prepared MED NEC; complete  Transport packet is complete  ROUNDTRIP to be utilized in AM when crisis obtains ETA to [facility]

## 2023-08-21 NOTE — ED NOTES
Received report from technician [address]  Pt is currently laying in bed with no signs of distress  Will continue to monitor        Sasha Carolina  06/25/18 7287 English

## (undated) DEVICE — DRAPE EQUIPMENT RF WAND

## (undated) DEVICE — INTENDED FOR TISSUE SEPARATION, AND OTHER PROCEDURES THAT REQUIRE A SHARP SURGICAL BLADE TO PUNCTURE OR CUT.: Brand: BARD-PARKER SAFETY BLADES SIZE 15, STERILE

## (undated) DEVICE — ABDOMINAL PAD: Brand: DERMACEA

## (undated) DEVICE — LIGHT HANDLE COVER SLEEVE DISP BLUE STELLAR

## (undated) DEVICE — KERLIX BANDAGE ROLL: Brand: KERLIX

## (undated) DEVICE — GLOVE SRG BIOGEL ECLIPSE 7

## (undated) DEVICE — STRL UNIVERSAL OUTPATIENT PACK: Brand: CARDINAL HEALTH

## (undated) DEVICE — GLOVE INDICATOR PI UNDERGLOVE SZ 7 BLUE